# Patient Record
Sex: MALE | Race: WHITE | NOT HISPANIC OR LATINO | Employment: OTHER | URBAN - METROPOLITAN AREA
[De-identification: names, ages, dates, MRNs, and addresses within clinical notes are randomized per-mention and may not be internally consistent; named-entity substitution may affect disease eponyms.]

---

## 2017-08-22 ENCOUNTER — PATIENT OUTREACH (OUTPATIENT)
Dept: ADMINISTRATIVE | Facility: HOSPITAL | Age: 77
End: 2017-08-22

## 2017-08-22 NOTE — LETTER
August 22, 2017    Rigoberto Perez  [address]  [place] [address]             [facility]  [address]  [facility] [address]  Phone: [phone] Dear Mr. Perez:    [facility] is committed to your overall health.  To help you get the most out of each of your visits, we will review your information to make sure you are up to date on all of your recommended tests and/or procedures.      As a new patient to Dr. Keri Hobson, we may not have your complete medical records.  She has found that you may be due for your fasting cholesterol labs and possibly some immunizations (tetanus, shingles, and pneumonia).     Medicare does not cover all immunizations to be given in the clinic.  Check your benefits to ensure that you do not need to receive your immunizations at the pharmacy.    If you have had any of the above done at another facility, please bring the records or information with you so that your record at [facility] will be complete.  If you would like to schedule any of these, please contact me.    If you are currently taking medication, please bring it with you to your appointment for review.    Also, if you have any type of Advanced Directives, please bring them with you to your office visit so we may scan them into your chart.    If you have any questions or concerns, please don't hesitate to call.    Thank you for letting us care for you,  Savannah Fernandez LPN Clinical Care Coordinator  [facility]  [phone]

## 2017-10-12 ENCOUNTER — OFFICE VISIT (OUTPATIENT)
Dept: FAMILY MEDICINE | Facility: CLINIC | Age: 77
End: 2017-10-12
Payer: MEDICARE

## 2017-10-12 VITALS
TEMPERATURE: 98 F | OXYGEN SATURATION: 97 % | BODY MASS INDEX: 15.52 KG/M2 | HEIGHT: 71 IN | DIASTOLIC BLOOD PRESSURE: 74 MMHG | HEART RATE: 73 BPM | RESPIRATION RATE: 16 BRPM | SYSTOLIC BLOOD PRESSURE: 122 MMHG | WEIGHT: 110.88 LBS

## 2017-10-12 DIAGNOSIS — R42 DIZZINESS: Primary | ICD-10-CM

## 2017-10-12 DIAGNOSIS — R63.4 WEIGHT LOSS: ICD-10-CM

## 2017-10-12 LAB
ALBUMIN SERPL BCP-MCNC: 3.7 G/DL
ALP SERPL-CCNC: 80 U/L
ALT SERPL W/O P-5'-P-CCNC: 13 U/L
ANION GAP SERPL CALC-SCNC: 8 MMOL/L
AST SERPL-CCNC: 22 U/L
BASOPHILS # BLD AUTO: 0.05 K/UL
BASOPHILS NFR BLD: 0.7 %
BILIRUB SERPL-MCNC: 0.3 MG/DL
BUN SERPL-MCNC: 25 MG/DL
CALCIUM SERPL-MCNC: 9.8 MG/DL
CHLORIDE SERPL-SCNC: 103 MMOL/L
CO2 SERPL-SCNC: 27 MMOL/L
CREAT SERPL-MCNC: 1.4 MG/DL
DIFFERENTIAL METHOD: ABNORMAL
EOSINOPHIL # BLD AUTO: 0.8 K/UL
EOSINOPHIL NFR BLD: 11.1 %
ERYTHROCYTE [DISTWIDTH] IN BLOOD BY AUTOMATED COUNT: 13.9 %
EST. GFR  (AFRICAN AMERICAN): 55.2 ML/MIN/1.73 M^2
EST. GFR  (NON AFRICAN AMERICAN): 47.8 ML/MIN/1.73 M^2
GLUCOSE SERPL-MCNC: 89 MG/DL
HCT VFR BLD AUTO: 35.7 %
HGB BLD-MCNC: 11.6 G/DL
LYMPHOCYTES # BLD AUTO: 1 K/UL
LYMPHOCYTES NFR BLD: 14.8 %
MCH RBC QN AUTO: 30.9 PG
MCHC RBC AUTO-ENTMCNC: 32.5 G/DL
MCV RBC AUTO: 95 FL
MONOCYTES # BLD AUTO: 0.6 K/UL
MONOCYTES NFR BLD: 8.7 %
NEUTROPHILS # BLD AUTO: 4.4 K/UL
NEUTROPHILS NFR BLD: 64.4 %
PLATELET # BLD AUTO: 160 K/UL
PMV BLD AUTO: 10.8 FL
POTASSIUM SERPL-SCNC: 4.9 MMOL/L
PROT SERPL-MCNC: 7.8 G/DL
RBC # BLD AUTO: 3.76 M/UL
SODIUM SERPL-SCNC: 138 MMOL/L
TSH SERPL DL<=0.005 MIU/L-ACNC: 2.47 UIU/ML
WBC # BLD AUTO: 6.77 K/UL

## 2017-10-12 PROCEDURE — 36415 COLL VENOUS BLD VENIPUNCTURE: CPT | Mod: S$GLB,,, | Performed by: NURSE PRACTITIONER

## 2017-10-12 PROCEDURE — 85025 COMPLETE CBC W/AUTO DIFF WBC: CPT

## 2017-10-12 PROCEDURE — 99203 OFFICE O/P NEW LOW 30 MIN: CPT | Mod: S$GLB,,, | Performed by: NURSE PRACTITIONER

## 2017-10-12 PROCEDURE — 93010 ELECTROCARDIOGRAM REPORT: CPT | Mod: ,,, | Performed by: INTERNAL MEDICINE

## 2017-10-12 PROCEDURE — 84443 ASSAY THYROID STIM HORMONE: CPT

## 2017-10-12 PROCEDURE — 93005 ELECTROCARDIOGRAM TRACING: CPT | Mod: S$GLB,,, | Performed by: NURSE PRACTITIONER

## 2017-10-12 PROCEDURE — 80053 COMPREHEN METABOLIC PANEL: CPT

## 2017-10-12 RX ORDER — ASPIRIN 81 MG/1
81 TABLET ORAL DAILY
COMMUNITY
End: 2022-08-29 | Stop reason: SDUPTHER

## 2017-10-13 ENCOUNTER — TELEPHONE (OUTPATIENT)
Dept: FAMILY MEDICINE | Facility: CLINIC | Age: 77
End: 2017-10-13

## 2017-10-13 DIAGNOSIS — D64.9 ANEMIA, UNSPECIFIED TYPE: ICD-10-CM

## 2017-10-13 DIAGNOSIS — Z12.11 COLON CANCER SCREENING: Primary | ICD-10-CM

## 2017-10-13 NOTE — TELEPHONE ENCOUNTER
Please let the patient at that his blood work did show that he is anemic. I would like for him to do a colonoscopy if he has never done this.  I will let him know once I review the records from Dr Fung

## 2017-10-13 NOTE — PROGRESS NOTES
Subjective:       Patient ID: Rigoberto Perez is a 77 y.o. male.    Chief Complaint: Dizziness (x several mths / constant  / especially when he stands up/ pt saw ENT in [place])    The patient is here with his wife.  He has been having dizziness constantly on and off for the past couple months.  He went to his primary care provider Dr. Demond Fung. His PCP referred him to ENT. The ENT told him that this could be related to his carotid arteries are hard and did an echo and a carotid ultrasound.  The patient tells me that the results were normal.  He did not follow up with his primary care regarding this dizziness.  He is brand-new to me today.  Upon further questioning he did have a 40 pound weight loss over the past year or so.  He denies any night sweats or black or bloody stools.  He tells me he has not had any blood work or an EKG done in relation to this dizziness.  He feels like he is going to fall when walking.  The dizziness does not make him nauseated.  He does still work every day and tells me his history is essentially normal with the exception of skin cancer.  He does smoke for many years and tells me he did have a pulmonary function test done by his primary care provider but he never got the result of this.      Review of Systems   Constitutional: Positive for unexpected weight change. Negative for activity change and appetite change.   HENT: Negative for congestion, postnasal drip, rhinorrhea and sinus pressure.    Eyes: Negative for pain and redness.   Respiratory: Negative for choking and chest tightness.    Gastrointestinal: Negative for abdominal distention, abdominal pain, blood in stool, constipation, diarrhea, nausea and vomiting.   Endocrine: Negative for polydipsia and polyphagia.   Genitourinary: Negative for dysuria and hematuria.   Musculoskeletal: Negative for arthralgias and myalgias.   Skin: Negative for color change and rash.   Neurological: Positive for dizziness. Negative for  "headaches.   Psychiatric/Behavioral: Negative for agitation and behavioral problems.       Objective:       Vitals:    10/12/17 1211   BP: 122/74   Pulse: 73   Resp: 16   Temp: 98.1 °F (36.7 °C)   TempSrc: Oral   SpO2: 97%   Weight: 50.3 kg (110 lb 14.3 oz)   Height: 5' 11" (1.803 m)   PainSc: 0-No pain       Physical Exam   Constitutional: He is oriented to person, place, and time. He appears well-developed and well-nourished. No distress.   Very thin, elderly male    HENT:   Head: Normocephalic and atraumatic.   Right Ear: Hearing, tympanic membrane, external ear and ear canal normal.   Left Ear: Hearing, tympanic membrane, external ear and ear canal normal.   Nose: Nose normal.   Mouth/Throat: Uvula is midline, oropharynx is clear and moist and mucous membranes are normal.   Eyes: Conjunctivae and EOM are normal. Pupils are equal, round, and reactive to light. Right eye exhibits no discharge. Left eye exhibits no discharge.   Neck: Trachea normal and normal range of motion. Neck supple. No JVD present. Carotid bruit is not present. No thyromegaly present.   Cardiovascular: Normal rate and regular rhythm.  Exam reveals no gallop and no friction rub.    No murmur heard.  Pulmonary/Chest: Effort normal and breath sounds normal. No respiratory distress. He has no wheezes. He has no rales. He exhibits no tenderness.   Abdominal: Soft. Bowel sounds are normal. He exhibits no distension and no mass. There is no tenderness. There is no rebound and no guarding.   Musculoskeletal: Normal range of motion.   Neurological: He is alert and oriented to person, place, and time. Coordination normal.   Skin: Skin is warm and dry. He is not diaphoretic.   Psychiatric: He has a normal mood and affect. His behavior is normal. Judgment and thought content normal.       Assessment:       1. Dizziness    2. Weight loss        Plan:       Rigoberto was seen today for dizziness.    Diagnoses and all orders for this visit:    Dizziness  -    "  CBC auto differential  -     Comprehensive metabolic panel  -     TSH  -     IN OFFICE EKG 12-LEAD (to Muse)    Weight loss  -     TSH     I will obtain records from his PCP and see what workup has been done thus far.         EKG shows sinus rhythm with a rate of 66 and a normal axis.  The EKG was personally reviewed by me here in the office.

## 2017-10-17 ENCOUNTER — TELEPHONE (OUTPATIENT)
Dept: FAMILY MEDICINE | Facility: CLINIC | Age: 77
End: 2017-10-17

## 2017-10-17 NOTE — TELEPHONE ENCOUNTER
----- Message from Lizette Madison sent at 10/16/2017  2:59 PM CDT -----  Contact: Patient's wife, Digna  Patient's wife is calling for patients test results.  Call Back# [phone]  Thanks

## 2017-11-07 ENCOUNTER — TELEPHONE (OUTPATIENT)
Dept: FAMILY MEDICINE | Facility: CLINIC | Age: 77
End: 2017-11-07

## 2017-11-07 NOTE — TELEPHONE ENCOUNTER
Received JAYLA from Dr Deepak lopez [facility] [place]

## 2017-11-16 ENCOUNTER — TELEPHONE (OUTPATIENT)
Dept: FAMILY MEDICINE | Facility: CLINIC | Age: 77
End: 2017-11-16

## 2017-11-16 NOTE — TELEPHONE ENCOUNTER
----- Message from Lizette Madison sent at 11/15/2017 10:53 AM CST -----  Contact: Patient's wife, Digna  Patient's wife is calling to see if the doctor received patients records from Dr. Lagos in [place].  Call Back#[phone]  Thanks

## 2017-12-22 ENCOUNTER — TELEPHONE (OUTPATIENT)
Dept: FAMILY MEDICINE | Facility: CLINIC | Age: 77
End: 2017-12-22

## 2017-12-22 RX ORDER — ALBUTEROL SULFATE 0.83 MG/ML
SOLUTION RESPIRATORY (INHALATION)
Refills: 0 | COMMUNITY
Start: 2017-12-20 | End: 2019-02-20 | Stop reason: SDUPTHER

## 2017-12-22 RX ORDER — DOXYCYCLINE 100 MG/1
100 CAPSULE ORAL 2 TIMES DAILY
Refills: 0 | COMMUNITY
Start: 2017-12-20 | End: 2018-01-11 | Stop reason: ALTCHOICE

## 2017-12-22 RX ORDER — PREDNISONE 20 MG/1
TABLET ORAL
Refills: 0 | COMMUNITY
Start: 2017-12-20 | End: 2018-01-11 | Stop reason: ALTCHOICE

## 2017-12-22 NOTE — TELEPHONE ENCOUNTER
----- Message from Krista Pascual sent at 12/22/2017 11:30 AM CST -----  Contact: Digna  Patient's wife is calling to state patient went to [facility] 12/20/17; Dx chronic bronchitis, low magnesium, dehydrated, and shortness of breath. Asked to speak to office before making appointment. Please call [phone]. Thanks!

## 2017-12-22 NOTE — TELEPHONE ENCOUNTER
Pt is feeling better, doing breathing treatments, taking abx etc, all added to chart for review. Please advise if pt will ilana follow up next week.

## 2017-12-22 NOTE — TELEPHONE ENCOUNTER
If he is 100% better he does not have to follow up with me but if he continues to have any symptoms I would like to see him in the office.

## 2018-01-11 ENCOUNTER — OFFICE VISIT (OUTPATIENT)
Dept: FAMILY MEDICINE | Facility: CLINIC | Age: 78
End: 2018-01-11
Payer: MEDICARE

## 2018-01-11 VITALS
DIASTOLIC BLOOD PRESSURE: 60 MMHG | HEIGHT: 71 IN | BODY MASS INDEX: 15.18 KG/M2 | TEMPERATURE: 98 F | SYSTOLIC BLOOD PRESSURE: 96 MMHG | WEIGHT: 108.44 LBS | RESPIRATION RATE: 20 BRPM | HEART RATE: 72 BPM

## 2018-01-11 DIAGNOSIS — J44.9 CHRONIC OBSTRUCTIVE PULMONARY DISEASE, UNSPECIFIED COPD TYPE: ICD-10-CM

## 2018-01-11 DIAGNOSIS — F17.200 TOBACCO USE DISORDER: ICD-10-CM

## 2018-01-11 DIAGNOSIS — Z12.11 COLON CANCER SCREENING: ICD-10-CM

## 2018-01-11 DIAGNOSIS — R42 DIZZINESS: Primary | ICD-10-CM

## 2018-01-11 PROCEDURE — 99214 OFFICE O/P EST MOD 30 MIN: CPT | Mod: 25,S$GLB,, | Performed by: NURSE PRACTITIONER

## 2018-01-11 PROCEDURE — 90662 IIV NO PRSV INCREASED AG IM: CPT | Mod: S$GLB,,, | Performed by: FAMILY MEDICINE

## 2018-01-11 PROCEDURE — G0008 ADMIN INFLUENZA VIRUS VAC: HCPCS | Mod: S$GLB,,, | Performed by: FAMILY MEDICINE

## 2018-01-11 RX ORDER — PREDNISONE 20 MG/1
TABLET ORAL
Qty: 18 TABLET | Refills: 0 | Status: SHIPPED | OUTPATIENT
Start: 2018-01-11 | End: 2018-01-11

## 2018-01-11 RX ORDER — NEBULIZER AND COMPRESSOR
EACH MISCELLANEOUS
COMMUNITY
Start: 2017-12-20

## 2018-01-11 RX ORDER — FLUTICASONE FUROATE AND VILANTEROL 200; 25 UG/1; UG/1
1 POWDER RESPIRATORY (INHALATION) DAILY
Qty: 30 EACH | Refills: 11 | Status: SHIPPED | OUTPATIENT
Start: 2018-01-11 | End: 2019-01-14 | Stop reason: SDUPTHER

## 2018-01-11 NOTE — PROGRESS NOTES
"Subjective:       Patient ID: Rigoberto Perez is a 77 y.o. male.    Chief Complaint: Follow-up    The patient is here for a follow-up visit.  He recently went to the ER with cough and congestion and was diagnosed with COPD exacerbation.  The patient does continue to smoke.  He was given antibiotics and steroids and tells me he feels much better now.  More importantly he tells me he is still dizzy and has been dizzy for quite some time.  He is not dizzy when changing positions just sometimes feels like he is off balance when standing up like he could fall.  His wife tells me he has severe instability when walking and often times falls.      The patient had an echocardiogram on 8/30/17 which shows an EF of 45-50%, a normal right ventricle.  Normal right ventricular systolic function, mild mitral regurg, mild tricuspid valve prolapse, no aortic regurgitation or stenosis and no pulmonic valvular regurgitation or stenosis.  Carotid artery done on 8/30/17 shows bilateral atheromatous plaquing that it is less than 50% diameter reduction      Review of Systems   Constitutional: Negative for appetite change, chills and fever.   HENT: Negative for ear pain and postnasal drip.    Eyes: Negative for pain and itching.   Respiratory: Negative for chest tightness and shortness of breath.    Gastrointestinal: Negative for abdominal distention and abdominal pain.   Endocrine: Negative for polydipsia and polyuria.   Genitourinary: Negative for difficulty urinating and flank pain.   Skin: Negative for color change and pallor.   Neurological: Positive for dizziness and weakness. Negative for light-headedness and headaches.   Hematological: Negative for adenopathy. Does not bruise/bleed easily.   Psychiatric/Behavioral: Negative for agitation.       Objective:       Vitals:    01/11/18 1530   BP: 96/60   Pulse: 72   Resp: 20   Temp: 97.8 °F (36.6 °C)   TempSrc: Oral   Weight: 49.2 kg (108 lb 7.5 oz)   Height: 5' 11" (1.803 m) "   PainSc: 0-No pain       Physical Exam   Constitutional: He is oriented to person, place, and time. He appears well-developed.   thin, elderly male.   HENT:   Head: Normocephalic and atraumatic.   Right Ear: External ear normal.   Left Ear: External ear normal.   Nose: Nose normal.   Mouth/Throat: Oropharynx is clear and moist.   Eyes: Conjunctivae are normal. Right eye exhibits no discharge. Left eye exhibits no discharge. No scleral icterus.   Neck: Normal range of motion. Neck supple. No tracheal deviation present.   Cardiovascular: Normal rate, regular rhythm and normal heart sounds.  Exam reveals no friction rub.    No murmur heard.  Pulmonary/Chest: Effort normal. No stridor. No respiratory distress. He has wheezes. He has no rales. He exhibits no tenderness.   Pulse ox 96%.  Mild inspiratory wheezing to the upper lobes.   Musculoskeletal: Normal range of motion.   Lymphadenopathy:     He has no cervical adenopathy.   Neurological: He is alert and oriented to person, place, and time.   Skin: Skin is warm and dry.   Psychiatric: He has a normal mood and affect.       Assessment:       1. Dizziness    2. Colon cancer screening    3. Chronic obstructive pulmonary disease, unspecified COPD type    4. Tobacco use disorder        Plan:       Rigoberto was seen today for follow-up.    Diagnoses and all orders for this visit:    Dizziness/gait instability  -     MRI Brain Without Contrast; Future    Colon cancer screening  -     Case request GI: COLONOSCOPY    Chronic obstructive pulmonary disease, unspecified COPD type  -     fluticasone-vilanterol (BREO) 200-25 mcg/dose DsDv diskus inhaler; Inhale 1 puff into the lungs once daily. Controller    Tobacco use disorder  Smoking cessation consult provided    -     Influenza - High Dose (65+) (PF) (IM)

## 2018-01-20 ENCOUNTER — HOSPITAL ENCOUNTER (OUTPATIENT)
Dept: RADIOLOGY | Facility: HOSPITAL | Age: 78
Discharge: HOME | End: 2018-01-20
Attending: NURSE PRACTITIONER
Payer: MEDICARE

## 2018-01-20 DIAGNOSIS — R42 DIZZINESS: ICD-10-CM

## 2018-01-20 PROCEDURE — 70551 MRI BRAIN STEM W/O DYE: CPT | Mod: TC,PO

## 2018-01-20 PROCEDURE — 70551 MRI BRAIN STEM W/O DYE: CPT | Mod: 26,,, | Performed by: RADIOLOGY

## 2018-01-22 ENCOUNTER — TELEPHONE (OUTPATIENT)
Dept: FAMILY MEDICINE | Facility: CLINIC | Age: 78
End: 2018-01-22

## 2018-01-22 NOTE — TELEPHONE ENCOUNTER
His MRI does not show any specific reason for his dizziness.  I will review this in detail at his upcoming 2/8/18 appointment

## 2018-01-25 ENCOUNTER — PATIENT OUTREACH (OUTPATIENT)
Dept: ADMINISTRATIVE | Facility: HOSPITAL | Age: 78
End: 2018-01-25

## 2018-01-25 NOTE — LETTER
January 25, 2018    Rigoberto Perez  [address] [address]  [place] [address]             [facility]  [address]  [facility] [address]  Phone: [phone] Dear Mr. Perez:    [facility] is committed to your overall health.  To help you get the most out of each of your visits, we will review your information to make sure you are up to date on all of your recommended tests and/or procedures.      TIESHA Davidson has found that your chart shows you may be due for your fasting cholesterol labs and possibly some immunizations (tetanus, shingles, and pneumonia).     Medicare does not cover all immunizations to be given in the clinic.  Check your benefits to ensure that you do not need to receive your immunizations at the pharmacy.    If you have had any of the above done at another facility, please bring the records or information with you so that your record at [facility] will be complete.  If you would like to schedule any of these, please contact me.    If you are currently taking medication, please bring it with you to your appointment for review.    Also, if you have any type of Advanced Directives, please bring them with you to your office visit so we may scan them into your chart.    If you have any questions or concerns, please don't hesitate to call.    Thank you for letting us care for you,  Savannah Fernandez LPN Clinical Care Coordinator  [facility]  [phone]

## 2018-02-08 ENCOUNTER — OFFICE VISIT (OUTPATIENT)
Dept: FAMILY MEDICINE | Facility: CLINIC | Age: 78
End: 2018-02-08
Payer: MEDICARE

## 2018-02-08 VITALS
HEIGHT: 71 IN | WEIGHT: 115.19 LBS | HEART RATE: 76 BPM | DIASTOLIC BLOOD PRESSURE: 70 MMHG | SYSTOLIC BLOOD PRESSURE: 136 MMHG | BODY MASS INDEX: 16.13 KG/M2 | TEMPERATURE: 98 F | RESPIRATION RATE: 20 BRPM

## 2018-02-08 DIAGNOSIS — R42 DIZZINESS: Primary | ICD-10-CM

## 2018-02-08 DIAGNOSIS — Z79.899 ENCOUNTER FOR LONG-TERM CURRENT USE OF MEDICATION: ICD-10-CM

## 2018-02-08 DIAGNOSIS — Z23 IMMUNIZATION DUE: ICD-10-CM

## 2018-02-08 PROCEDURE — 3008F BODY MASS INDEX DOCD: CPT | Mod: S$GLB,,, | Performed by: NURSE PRACTITIONER

## 2018-02-08 PROCEDURE — 1126F AMNT PAIN NOTED NONE PRSNT: CPT | Mod: S$GLB,,, | Performed by: NURSE PRACTITIONER

## 2018-02-08 PROCEDURE — 1159F MED LIST DOCD IN RCRD: CPT | Mod: S$GLB,,, | Performed by: NURSE PRACTITIONER

## 2018-02-08 PROCEDURE — 90670 PCV13 VACCINE IM: CPT | Mod: S$GLB,,, | Performed by: FAMILY MEDICINE

## 2018-02-08 PROCEDURE — 99213 OFFICE O/P EST LOW 20 MIN: CPT | Mod: S$GLB,,, | Performed by: NURSE PRACTITIONER

## 2018-02-08 PROCEDURE — G0009 ADMIN PNEUMOCOCCAL VACCINE: HCPCS | Mod: S$GLB,,, | Performed by: FAMILY MEDICINE

## 2018-02-08 NOTE — PROGRESS NOTES
"Subjective:       Patient ID: Rigoberto Perez is a 77 y.o. male.    Chief Complaint: Follow-up    The patient is here for a follow-up dizziness visit.  Since our last appointment we did have an MRI of the brain done which reveals no acute abnormality.  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  The patient states since our last visit his dizziness has improved significantly and he would not like to have any further testing done.  He denies any chest pain or palpitations.  He has also quit smoking since our last visit. He has had many stress test in past (negative per patient).       Review of Systems   Constitutional: Negative for activity change and appetite change.   HENT: Negative for congestion, postnasal drip, rhinorrhea and sinus pressure.    Eyes: Negative for pain and redness.   Respiratory: Negative for choking and chest tightness.    Gastrointestinal: Negative for abdominal distention, abdominal pain, blood in stool, constipation, diarrhea, nausea and vomiting.   Endocrine: Negative for polydipsia and polyphagia.   Genitourinary: Negative for dysuria and hematuria.   Musculoskeletal: Negative for arthralgias and myalgias.   Skin: Negative for color change and rash.   Neurological: Negative for dizziness and headaches.   Psychiatric/Behavioral: Negative for agitation and behavioral problems.       Objective:       Vitals:    02/08/18 0853   BP: 136/70   Pulse: 76   Resp: 20   Temp: 97.9 °F (36.6 °C)   TempSrc: Oral   Weight: 52.3 kg (115 lb 3.1 oz)   Height: 5' 11" (1.803 m)   PainSc: 0-No pain       Physical Exam   Constitutional: He is oriented to person, place, and time. He appears well-developed and well-nourished.   HENT:   Head: Normocephalic and atraumatic.   Right Ear: External ear normal.   Left Ear: External ear normal.   Nose: Nose normal.   Mouth/Throat: Oropharynx is clear and moist.   Eyes: Conjunctivae are normal. Right eye exhibits no " discharge. Left eye exhibits no discharge. No scleral icterus.   Neck: Normal range of motion. Neck supple. No tracheal deviation present.   Cardiovascular: Normal rate, regular rhythm and normal heart sounds.  Exam reveals no friction rub.    No murmur heard.  Pulmonary/Chest: Effort normal and breath sounds normal. No stridor. No respiratory distress. He has no wheezes. He has no rales. He exhibits no tenderness.   Musculoskeletal: Normal range of motion.   Lymphadenopathy:     He has no cervical adenopathy.   Neurological: He is alert and oriented to person, place, and time.   Skin: Skin is warm and dry.   Psychiatric: He has a normal mood and affect.       Assessment:       1. Dizziness    2. Encounter for long-term current use of medication    3. Immunization due        Plan:       Rigoberto was seen today for follow-up.    Diagnoses and all orders for this visit:    Dizziness-improved.  The patient declined further testing as I recommended a stress test as well as a low-dose CT to rule out lung cancer for screening    Encounter for long-term current use of medication  -     CBC auto differential; Future  -     Comprehensive metabolic panel; Future  -     Lipid panel; Future  -     Hemoglobin A1c; Future  -     TSH; Future    Immunization due  -     (In Office Administered) Pneumococcal Conjugate Vaccine (13 Valent) (IM)

## 2018-02-19 ENCOUNTER — TELEPHONE (OUTPATIENT)
Dept: GASTROENTEROLOGY | Facility: CLINIC | Age: 78
End: 2018-02-19

## 2019-01-15 RX ORDER — FLUTICASONE FUROATE AND VILANTEROL TRIFENATATE 200; 25 UG/1; UG/1
POWDER RESPIRATORY (INHALATION)
Qty: 1 EACH | Refills: 0 | Status: SHIPPED | OUTPATIENT
Start: 2019-01-15 | End: 2019-01-23 | Stop reason: SDUPTHER

## 2019-01-23 ENCOUNTER — OFFICE VISIT (OUTPATIENT)
Dept: FAMILY MEDICINE | Facility: CLINIC | Age: 79
End: 2019-01-23
Payer: MEDICARE

## 2019-01-23 VITALS
RESPIRATION RATE: 18 BRPM | DIASTOLIC BLOOD PRESSURE: 70 MMHG | OXYGEN SATURATION: 98 % | BODY MASS INDEX: 15.65 KG/M2 | WEIGHT: 111.75 LBS | HEIGHT: 71 IN | TEMPERATURE: 98 F | HEART RATE: 76 BPM | SYSTOLIC BLOOD PRESSURE: 126 MMHG

## 2019-01-23 DIAGNOSIS — Z79.899 ENCOUNTER FOR LONG-TERM CURRENT USE OF MEDICATION: ICD-10-CM

## 2019-01-23 DIAGNOSIS — J44.1 COPD EXACERBATION: ICD-10-CM

## 2019-01-23 DIAGNOSIS — F17.200 TOBACCO USE DISORDER: ICD-10-CM

## 2019-01-23 DIAGNOSIS — J32.9 SINUSITIS, UNSPECIFIED CHRONICITY, UNSPECIFIED LOCATION: ICD-10-CM

## 2019-01-23 DIAGNOSIS — J44.9 CHRONIC OBSTRUCTIVE PULMONARY DISEASE, UNSPECIFIED COPD TYPE: Primary | ICD-10-CM

## 2019-01-23 LAB
ALBUMIN SERPL BCP-MCNC: 3.9 G/DL
ALP SERPL-CCNC: 131 U/L
ALT SERPL W/O P-5'-P-CCNC: 13 U/L
ANION GAP SERPL CALC-SCNC: 8 MMOL/L
AST SERPL-CCNC: 24 U/L
BASOPHILS # BLD AUTO: 0.06 K/UL
BASOPHILS NFR BLD: 0.7 %
BILIRUB SERPL-MCNC: 0.4 MG/DL
BUN SERPL-MCNC: 20 MG/DL
CALCIUM SERPL-MCNC: 10.2 MG/DL
CHLORIDE SERPL-SCNC: 100 MMOL/L
CHOLEST SERPL-MCNC: 167 MG/DL
CHOLEST/HDLC SERPL: 3.2 {RATIO}
CO2 SERPL-SCNC: 28 MMOL/L
CREAT SERPL-MCNC: 1.3 MG/DL
DIFFERENTIAL METHOD: ABNORMAL
EOSINOPHIL # BLD AUTO: 0.2 K/UL
EOSINOPHIL NFR BLD: 2.1 %
ERYTHROCYTE [DISTWIDTH] IN BLOOD BY AUTOMATED COUNT: 13.4 %
EST. GFR  (AFRICAN AMERICAN): 60 ML/MIN/1.73 M^2
EST. GFR  (NON AFRICAN AMERICAN): 51.9 ML/MIN/1.73 M^2
ESTIMATED AVG GLUCOSE: 105 MG/DL
GLUCOSE SERPL-MCNC: 77 MG/DL
HBA1C MFR BLD HPLC: 5.3 %
HCT VFR BLD AUTO: 37.5 %
HDLC SERPL-MCNC: 52 MG/DL
HDLC SERPL: 31.1 %
HGB BLD-MCNC: 12.3 G/DL
IMM GRANULOCYTES # BLD AUTO: 0.01 K/UL
IMM GRANULOCYTES NFR BLD AUTO: 0.1 %
LDLC SERPL CALC-MCNC: 100.2 MG/DL
LYMPHOCYTES # BLD AUTO: 1.4 K/UL
LYMPHOCYTES NFR BLD: 15.5 %
MCH RBC QN AUTO: 31.4 PG
MCHC RBC AUTO-ENTMCNC: 32.8 G/DL
MCV RBC AUTO: 96 FL
MONOCYTES # BLD AUTO: 0.5 K/UL
MONOCYTES NFR BLD: 6.2 %
NEUTROPHILS # BLD AUTO: 6.6 K/UL
NEUTROPHILS NFR BLD: 75.4 %
NONHDLC SERPL-MCNC: 115 MG/DL
NRBC BLD-RTO: 0 /100 WBC
PLATELET # BLD AUTO: 230 K/UL
PMV BLD AUTO: 10 FL
POTASSIUM SERPL-SCNC: 4.9 MMOL/L
PROT SERPL-MCNC: 8 G/DL
RBC # BLD AUTO: 3.92 M/UL
SODIUM SERPL-SCNC: 136 MMOL/L
TRIGL SERPL-MCNC: 74 MG/DL
TSH SERPL DL<=0.005 MIU/L-ACNC: 2.13 UIU/ML
WBC # BLD AUTO: 8.73 K/UL

## 2019-01-23 PROCEDURE — 80053 COMPREHEN METABOLIC PANEL: CPT

## 2019-01-23 PROCEDURE — 80061 LIPID PANEL: CPT

## 2019-01-23 PROCEDURE — 99214 OFFICE O/P EST MOD 30 MIN: CPT | Mod: S$GLB,,, | Performed by: NURSE PRACTITIONER

## 2019-01-23 PROCEDURE — 84443 ASSAY THYROID STIM HORMONE: CPT

## 2019-01-23 PROCEDURE — 36415 PR COLLECTION VENOUS BLOOD,VENIPUNCTURE: ICD-10-PCS | Mod: S$GLB,,, | Performed by: NURSE PRACTITIONER

## 2019-01-23 PROCEDURE — 85025 COMPLETE CBC W/AUTO DIFF WBC: CPT

## 2019-01-23 PROCEDURE — 83036 HEMOGLOBIN GLYCOSYLATED A1C: CPT

## 2019-01-23 PROCEDURE — 1101F PR PT FALLS ASSESS DOC 0-1 FALLS W/OUT INJ PAST YR: ICD-10-PCS | Mod: CPTII,S$GLB,, | Performed by: NURSE PRACTITIONER

## 2019-01-23 PROCEDURE — 36415 COLL VENOUS BLD VENIPUNCTURE: CPT | Mod: S$GLB,,, | Performed by: NURSE PRACTITIONER

## 2019-01-23 PROCEDURE — 99214 PR OFFICE/OUTPT VISIT, EST, LEVL IV, 30-39 MIN: ICD-10-PCS | Mod: S$GLB,,, | Performed by: NURSE PRACTITIONER

## 2019-01-23 PROCEDURE — 1101F PT FALLS ASSESS-DOCD LE1/YR: CPT | Mod: CPTII,S$GLB,, | Performed by: NURSE PRACTITIONER

## 2019-01-23 RX ORDER — FLUTICASONE FUROATE AND VILANTEROL 200; 25 UG/1; UG/1
POWDER RESPIRATORY (INHALATION)
Qty: 1 EACH | Refills: 11 | Status: SHIPPED | OUTPATIENT
Start: 2019-01-23 | End: 2022-08-29

## 2019-01-23 RX ORDER — DOXYCYCLINE 100 MG/1
100 CAPSULE ORAL 2 TIMES DAILY
Qty: 14 CAPSULE | Refills: 0 | Status: SHIPPED | OUTPATIENT
Start: 2019-01-23 | End: 2019-01-30

## 2019-01-23 RX ORDER — PREDNISONE 20 MG/1
TABLET ORAL
Qty: 18 TABLET | Refills: 0 | Status: SHIPPED | OUTPATIENT
Start: 2019-01-23 | End: 2019-02-20 | Stop reason: ALTCHOICE

## 2019-01-23 NOTE — PROGRESS NOTES
Subjective:       Patient ID: Rigoberto Perez is a 78 y.o. male.    Chief Complaint: Preventative Health Care (Physical/ refills for Breo)    The patient is here for annual visit. He is having URI symptoms over the past few weeks. See URI ROS.   The patient has a history of COPD that has been well controlled with Breo until this recent infection.  He does need a refill on this medication.  Otherwise he has no other complaints.  His wife is with him today.  He did resume smoking again.      URI    This is a new problem. Episode onset: 2-3 weeks. The problem has been gradually worsening. Associated symptoms include congestion, coughing, headaches, a plugged ear sensation, rhinorrhea, sinus pain, sneezing, a sore throat, swollen glands and wheezing. Pertinent negatives include no abdominal pain, chest pain, diarrhea, dysuria, joint pain, joint swelling, nausea, neck pain, rash or vomiting. He has tried decongestant, antihistamine, increased fluids and acetaminophen for the symptoms. The treatment provided no relief.     Review of Systems   Constitutional: Positive for appetite change and fatigue.   HENT: Positive for congestion, postnasal drip, rhinorrhea, sinus pressure, sinus pain, sneezing and sore throat.    Eyes: Negative for pain and redness.   Respiratory: Positive for cough and wheezing. Negative for choking, chest tightness and shortness of breath.    Cardiovascular: Negative for chest pain and palpitations.   Gastrointestinal: Negative for abdominal distention, abdominal pain, constipation, diarrhea, nausea and vomiting.   Endocrine: Negative for polydipsia and polyphagia.   Genitourinary: Negative for dysuria and hematuria.   Musculoskeletal: Negative for arthralgias, joint pain, joint swelling, myalgias and neck pain.   Skin: Negative for color change, pallor and rash.   Neurological: Positive for headaches. Negative for dizziness and light-headedness.       Past medical, surgical, family and social  "history reviewed.  Objective:     Vitals:    01/23/19 1043   BP: 126/70   Pulse: 76   Resp: 18   Temp: 98.1 °F (36.7 °C)   TempSrc: Oral   SpO2: 98%   Weight: 50.7 kg (111 lb 12.4 oz)   Height: 5' 11" (1.803 m)   PainSc: 0-No pain     Body mass index is 15.59 kg/m².     Physical Exam   Constitutional: He is oriented to person, place, and time. He appears well-developed. No distress.   Under weight elderly male   HENT:   Head: Normocephalic and atraumatic.   Right Ear: Hearing, tympanic membrane, external ear and ear canal normal.   Left Ear: Hearing, tympanic membrane, external ear and ear canal normal.   Nose: Nose normal. No nose lacerations or sinus tenderness.   Mouth/Throat: Uvula is midline, oropharynx is clear and moist and mucous membranes are normal.   Eyes: Conjunctivae and EOM are normal. Pupils are equal, round, and reactive to light. Right eye exhibits no discharge. Left eye exhibits no discharge. No scleral icterus.   Neck: Trachea normal and normal range of motion. Neck supple. No JVD present. Carotid bruit is not present. No tracheal deviation present. No thyromegaly present.   Cardiovascular: Normal rate and regular rhythm. Exam reveals no gallop and no friction rub.   No murmur heard.  Pulmonary/Chest: Effort normal. No stridor. No respiratory distress. He has wheezes. He has no rales. He exhibits no tenderness.   Inspiratory and expiratory wheezing to the upper lobes.   Abdominal: Soft. Bowel sounds are normal. He exhibits no distension and no mass. There is no tenderness. There is no rebound and no guarding.   Musculoskeletal: Normal range of motion.   Lymphadenopathy:     He has cervical adenopathy.   Neurological: He is alert and oriented to person, place, and time. Coordination normal.   Skin: Skin is warm and dry. He is not diaphoretic.   Psychiatric: He has a normal mood and affect. His behavior is normal. Judgment and thought content normal.     Venipuncture performed with 20 gauge " butterfly, x's 1 attempt,  to R Antecubital vein.  Specimens collected per orders.      Pressure dressing applied to site, instructed patient to remove dressing in 10-15 minutes, OK to re-adjust dressing if pressure causing any discomfort, to observe closely for numbness and/or discoloration to hand or fingers, and to notify provider if bleeding persists after applying constant pressure lasting 30 minutes.            Assessment:       1. Chronic obstructive pulmonary disease, unspecified COPD type    2. Encounter for long-term current use of medication    3. Sinusitis, unspecified chronicity, unspecified location    4. COPD exacerbation        Plan:       Rigoberto was seen today for preventative health care.    Diagnoses and all orders for this visit:    Chronic obstructive pulmonary disease, unspecified COPD type  -     fluticasone-vilanterol (BREO ELLIPTA) 200-25 mcg/dose DsDv diskus inhaler; INHALE ONE PUFF into the lungs ONCE DAILY **controller**    Encounter for long-term current use of medication  -     CBC auto differential  -     Comprehensive metabolic panel  -     Hemoglobin A1c  -     Lipid panel  -     TSH    Sinusitis, unspecified chronicity, unspecified location    -     doxycycline (VIBRAMYCIN) 100 MG Cap; Take 1 capsule (100 mg total) by mouth 2 (two) times daily. for 7 days      COPD exacerbation  -     predniSONE (DELTASONE) 20 MG tablet; Take 3 tablets daily for 3 days, then 2 tablets daily for 3 days, then 1 tablet daily for 3 days        smoking cessation counseling provided

## 2019-02-02 ENCOUNTER — TELEPHONE (OUTPATIENT)
Dept: FAMILY MEDICINE | Facility: CLINIC | Age: 79
End: 2019-02-02

## 2019-02-02 NOTE — TELEPHONE ENCOUNTER
"Called patient and informed him per provider "Labs stable   Follow up with me in 1 year or sooner if needed ". Patient verbalized understanding.   "

## 2019-02-02 NOTE — TELEPHONE ENCOUNTER
----- Message from Joan Cadena NP sent at 2/1/2019 11:18 AM CST -----  Labs stable  Follow up with me in 1 year or sooner if needed

## 2019-02-14 ENCOUNTER — TELEPHONE (OUTPATIENT)
Dept: FAMILY MEDICINE | Facility: CLINIC | Age: 79
End: 2019-02-14

## 2019-02-14 NOTE — TELEPHONE ENCOUNTER
Spoke w/ pt wife , pt has chest congestion x 1 week . Pt has slight cough. Pt denies fever, denies wheezing. Pt feels tired and fatigued. No appts available in [facility] , offered to sched pt an appt in [place] , pt wife refused. Advised wife she can bring pt to urgent care. Advised on importance of staying hydrated. --lp

## 2019-02-14 NOTE — TELEPHONE ENCOUNTER
----- Message from Thais Hinojosa sent at 2/14/2019  9:26 AM CST -----  Type:  Same Day Appointment Request    Caller is requesting a same day appointment.  Caller declined first available appointment listed below.      Name of Caller:  Tavia curiel  When is the first available appointment?  02/19/19  Symptoms:  Congestion not feeling well  Best Call Back Number:  [phone]  Additional Information:

## 2019-02-20 ENCOUNTER — OFFICE VISIT (OUTPATIENT)
Dept: FAMILY MEDICINE | Facility: CLINIC | Age: 79
End: 2019-02-20
Payer: MEDICARE

## 2019-02-20 VITALS
SYSTOLIC BLOOD PRESSURE: 100 MMHG | HEIGHT: 71 IN | BODY MASS INDEX: 14.61 KG/M2 | DIASTOLIC BLOOD PRESSURE: 60 MMHG | HEART RATE: 76 BPM | TEMPERATURE: 98 F | RESPIRATION RATE: 24 BRPM | WEIGHT: 104.38 LBS | OXYGEN SATURATION: 94 %

## 2019-02-20 DIAGNOSIS — F17.200 TOBACCO USE DISORDER: ICD-10-CM

## 2019-02-20 DIAGNOSIS — J32.9 SINUSITIS, UNSPECIFIED CHRONICITY, UNSPECIFIED LOCATION: Primary | ICD-10-CM

## 2019-02-20 DIAGNOSIS — J44.1 COPD EXACERBATION: ICD-10-CM

## 2019-02-20 PROCEDURE — 99214 PR OFFICE/OUTPT VISIT, EST, LEVL IV, 30-39 MIN: ICD-10-PCS | Mod: S$GLB,,, | Performed by: NURSE PRACTITIONER

## 2019-02-20 PROCEDURE — 99214 OFFICE O/P EST MOD 30 MIN: CPT | Mod: S$GLB,,, | Performed by: NURSE PRACTITIONER

## 2019-02-20 PROCEDURE — 1101F PR PT FALLS ASSESS DOC 0-1 FALLS W/OUT INJ PAST YR: ICD-10-PCS | Mod: CPTII,S$GLB,, | Performed by: NURSE PRACTITIONER

## 2019-02-20 PROCEDURE — 1101F PT FALLS ASSESS-DOCD LE1/YR: CPT | Mod: CPTII,S$GLB,, | Performed by: NURSE PRACTITIONER

## 2019-02-20 RX ORDER — DOXYCYCLINE 100 MG/1
100 CAPSULE ORAL 2 TIMES DAILY
Qty: 14 CAPSULE | Refills: 0 | Status: SHIPPED | OUTPATIENT
Start: 2019-02-20 | End: 2019-02-27

## 2019-02-20 RX ORDER — BENZONATATE 100 MG/1
100 CAPSULE ORAL 3 TIMES DAILY
Refills: 0 | COMMUNITY
Start: 2019-02-15 | End: 2022-08-29

## 2019-02-20 RX ORDER — ALBUTEROL SULFATE 0.83 MG/ML
SOLUTION RESPIRATORY (INHALATION)
Qty: 1 BOX | Refills: 1 | Status: SHIPPED | OUTPATIENT
Start: 2019-02-20 | End: 2022-06-26 | Stop reason: SDUPTHER

## 2019-02-20 RX ORDER — PREDNISONE 20 MG/1
TABLET ORAL
Qty: 18 TABLET | Refills: 0 | Status: SHIPPED | OUTPATIENT
Start: 2019-02-20 | End: 2022-06-26 | Stop reason: SDUPTHER

## 2019-02-24 NOTE — PROGRESS NOTES
"Subjective:       Patient ID: Rigoberto Perez is a 78 y.o. male.    Chief Complaint: Cough (Symptoms for about 2 weeks) and Chest Congestion    Patient does have COPD currently using Breo daily      URI    This is a new problem. The current episode started 1 to 4 weeks ago. The problem has been unchanged. Associated symptoms include congestion, coughing, ear pain, headaches, a plugged ear sensation, rhinorrhea, sinus pain, sneezing, a sore throat, swollen glands and wheezing. Pertinent negatives include no abdominal pain, chest pain, diarrhea, dysuria, nausea or vomiting. He has tried antihistamine (Albuterol nebulizers daily) for the symptoms. The treatment provided mild relief.     Review of Systems   Constitutional: Positive for appetite change and fatigue.   HENT: Positive for congestion, ear pain, postnasal drip, rhinorrhea, sinus pressure, sinus pain, sneezing and sore throat.    Eyes: Negative for pain and redness.   Respiratory: Positive for cough, chest tightness, shortness of breath and wheezing. Negative for choking.    Cardiovascular: Negative for chest pain and palpitations.   Gastrointestinal: Negative for abdominal distention, abdominal pain, constipation, diarrhea, nausea and vomiting.   Endocrine: Negative for polydipsia and polyphagia.   Genitourinary: Negative for dysuria and hematuria.   Musculoskeletal: Negative for arthralgias, joint swelling and myalgias.   Skin: Negative for color change and pallor.   Neurological: Positive for headaches. Negative for dizziness and light-headedness.       Past medical, surgical, family and social history reviewed.  Objective:     Vitals:    02/20/19 1225   BP: 100/60   Pulse: 76   Resp: (!) 24   Temp: 97.7 °F (36.5 °C)   TempSrc: Oral   SpO2: (!) 94%   Weight: 47.3 kg (104 lb 6.2 oz)   Height: 5' 11" (1.803 m)   PainSc: 0-No pain     Body mass index is 14.56 kg/m².     Physical Exam   Constitutional: He is oriented to person, place, and time. He appears " well-developed.   Thin, elderly male   HENT:   Head: Normocephalic and atraumatic.   Right Ear: Hearing, tympanic membrane, external ear and ear canal normal.   Left Ear: Hearing, tympanic membrane, external ear and ear canal normal.   Nose: Mucosal edema and rhinorrhea present. No nose lacerations or sinus tenderness. Right sinus exhibits maxillary sinus tenderness and frontal sinus tenderness. Left sinus exhibits maxillary sinus tenderness and frontal sinus tenderness.   Mouth/Throat: Uvula is midline and mucous membranes are normal. Posterior oropharyngeal edema and posterior oropharyngeal erythema present.   Eyes: Conjunctivae are normal. Right eye exhibits no discharge. Left eye exhibits no discharge. No scleral icterus.   Neck: Normal range of motion. Neck supple. No tracheal deviation present.   Cardiovascular: Normal rate and regular rhythm.   No murmur heard.  Pulmonary/Chest: Effort normal. No stridor. No respiratory distress. He has wheezes. He has no rales. He exhibits no tenderness.   Wheezing to the upper lobes bilaterally upon inspiration and expiration   Musculoskeletal: Normal range of motion.   Lymphadenopathy:     He has cervical adenopathy.   Neurological: He is alert and oriented to person, place, and time.   Skin: Skin is warm and dry.   Psychiatric: He has a normal mood and affect. His behavior is normal. Judgment and thought content normal.       Assessment:       1. Sinusitis, unspecified chronicity, unspecified location    2. COPD exacerbation    3. Tobacco use disorder        Plan:       Rigoberto was seen today for cough and chest congestion.    Diagnoses and all orders for this visit:    Sinusitis, unspecified chronicity, unspecified location  -     doxycycline (VIBRAMYCIN) 100 MG Cap; Take 1 capsule (100 mg total) by mouth 2 (two) times daily. for 7 days    COPD exacerbation    -     predniSONE (DELTASONE) 20 MG tablet; Take 3 tablets daily for 3 days, then 2 tablets daily for 3 days,  then 1 tablet daily for 3 days  -     albuterol (PROVENTIL) 2.5 mg /3 mL (0.083 %) nebulizer solution; Take 3 mLs (2.5 mg total) by nebulization every 4 (four) hours as needed for Wheezing or Shortness of Breath.      Tobacco use disorder  Smoking cessation counseling provided.

## 2022-06-26 ENCOUNTER — HOSPITAL ENCOUNTER (EMERGENCY)
Facility: HOSPITAL | Age: 82
Discharge: HOME | End: 2022-06-26
Attending: FAMILY MEDICINE
Payer: COMMERCIAL

## 2022-06-26 VITALS
SYSTOLIC BLOOD PRESSURE: 141 MMHG | HEART RATE: 85 BPM | TEMPERATURE: 98 F | WEIGHT: 106.94 LBS | RESPIRATION RATE: 30 BRPM | OXYGEN SATURATION: 94 % | DIASTOLIC BLOOD PRESSURE: 64 MMHG | BODY MASS INDEX: 14.91 KG/M2

## 2022-06-26 DIAGNOSIS — U07.1 COVID: Primary | ICD-10-CM

## 2022-06-26 DIAGNOSIS — U07.1 COVID-19 VIRUS DETECTED: ICD-10-CM

## 2022-06-26 DIAGNOSIS — R06.02 SOB (SHORTNESS OF BREATH): ICD-10-CM

## 2022-06-26 DIAGNOSIS — R06.02 SHORTNESS OF BREATH: ICD-10-CM

## 2022-06-26 LAB
ALBUMIN SERPL BCP-MCNC: 3.5 G/DL (ref 3.5–5.2)
ALP SERPL-CCNC: 101 U/L (ref 55–135)
ALT SERPL W/O P-5'-P-CCNC: 14 U/L (ref 10–44)
ANION GAP SERPL CALC-SCNC: 12 MMOL/L (ref 8–16)
AST SERPL-CCNC: 22 U/L (ref 10–40)
BASOPHILS # BLD AUTO: 0.01 K/UL (ref 0–0.2)
BASOPHILS NFR BLD: 0.2 % (ref 0–1.9)
BILIRUB SERPL-MCNC: 0.5 MG/DL (ref 0.1–1)
BNP SERPL-MCNC: 270 PG/ML (ref 0–99)
BUN SERPL-MCNC: 26 MG/DL (ref 8–23)
CALCIUM SERPL-MCNC: 9.6 MG/DL (ref 8.7–10.5)
CHLORIDE SERPL-SCNC: 103 MMOL/L (ref 95–110)
CO2 SERPL-SCNC: 25 MMOL/L (ref 23–29)
CREAT SERPL-MCNC: 1.5 MG/DL (ref 0.5–1.4)
DIFFERENTIAL METHOD: ABNORMAL
EOSINOPHIL # BLD AUTO: 0 K/UL (ref 0–0.5)
EOSINOPHIL NFR BLD: 0.5 % (ref 0–8)
ERYTHROCYTE [DISTWIDTH] IN BLOOD BY AUTOMATED COUNT: 13.3 % (ref 11.5–14.5)
EST. GFR  (AFRICAN AMERICAN): 49 ML/MIN/1.73 M^2
EST. GFR  (NON AFRICAN AMERICAN): 43 ML/MIN/1.73 M^2
GLUCOSE SERPL-MCNC: 119 MG/DL (ref 70–110)
HCT VFR BLD AUTO: 39.3 % (ref 40–54)
HGB BLD-MCNC: 12.8 G/DL (ref 14–18)
IMM GRANULOCYTES # BLD AUTO: 0.03 K/UL (ref 0–0.04)
IMM GRANULOCYTES NFR BLD AUTO: 0.5 % (ref 0–0.5)
LYMPHOCYTES # BLD AUTO: 0.7 K/UL (ref 1–4.8)
LYMPHOCYTES NFR BLD: 11 % (ref 18–48)
MCH RBC QN AUTO: 30.1 PG (ref 27–31)
MCHC RBC AUTO-ENTMCNC: 32.6 G/DL (ref 32–36)
MCV RBC AUTO: 93 FL (ref 82–98)
MONOCYTES # BLD AUTO: 0.5 K/UL (ref 0.3–1)
MONOCYTES NFR BLD: 8.1 % (ref 4–15)
NEUTROPHILS # BLD AUTO: 4.8 K/UL (ref 1.8–7.7)
NEUTROPHILS NFR BLD: 79.7 % (ref 38–73)
NRBC BLD-RTO: 0 /100 WBC
PLATELET # BLD AUTO: 145 K/UL (ref 150–450)
PMV BLD AUTO: 10.5 FL (ref 9.2–12.9)
POTASSIUM SERPL-SCNC: 4.2 MMOL/L (ref 3.5–5.1)
PROT SERPL-MCNC: 8.1 G/DL (ref 6–8.4)
RBC # BLD AUTO: 4.25 M/UL (ref 4.6–6.2)
SARS-COV-2 RDRP RESP QL NAA+PROBE: POSITIVE
SODIUM SERPL-SCNC: 140 MMOL/L (ref 136–145)
TROPONIN I SERPL DL<=0.01 NG/ML-MCNC: <0.006 NG/ML (ref 0–0.03)
WBC # BLD AUTO: 6.07 K/UL (ref 3.9–12.7)

## 2022-06-26 PROCEDURE — 63600175 PHARM REV CODE 636 W HCPCS: Performed by: FAMILY MEDICINE

## 2022-06-26 PROCEDURE — 85025 COMPLETE CBC W/AUTO DIFF WBC: CPT | Performed by: REGISTERED NURSE

## 2022-06-26 PROCEDURE — 84484 ASSAY OF TROPONIN QUANT: CPT | Performed by: REGISTERED NURSE

## 2022-06-26 PROCEDURE — U0002 COVID-19 LAB TEST NON-CDC: HCPCS | Performed by: REGISTERED NURSE

## 2022-06-26 PROCEDURE — 93005 ELECTROCARDIOGRAM TRACING: CPT

## 2022-06-26 PROCEDURE — 25000242 PHARM REV CODE 250 ALT 637 W/ HCPCS: Performed by: FAMILY MEDICINE

## 2022-06-26 PROCEDURE — 93010 ELECTROCARDIOGRAM REPORT: CPT | Mod: ,,, | Performed by: INTERNAL MEDICINE

## 2022-06-26 PROCEDURE — 94640 AIRWAY INHALATION TREATMENT: CPT | Mod: XB

## 2022-06-26 PROCEDURE — 83880 ASSAY OF NATRIURETIC PEPTIDE: CPT | Performed by: REGISTERED NURSE

## 2022-06-26 PROCEDURE — 93010 EKG 12-LEAD: ICD-10-PCS | Mod: ,,, | Performed by: INTERNAL MEDICINE

## 2022-06-26 PROCEDURE — 96374 THER/PROPH/DIAG INJ IV PUSH: CPT

## 2022-06-26 PROCEDURE — 80053 COMPREHEN METABOLIC PANEL: CPT | Performed by: REGISTERED NURSE

## 2022-06-26 PROCEDURE — 99285 EMERGENCY DEPT VISIT HI MDM: CPT | Mod: 25

## 2022-06-26 RX ORDER — METHYLPREDNISOLONE SOD SUCC 125 MG
125 VIAL (EA) INJECTION
Status: COMPLETED | OUTPATIENT
Start: 2022-06-26 | End: 2022-06-26

## 2022-06-26 RX ORDER — PREDNISONE 20 MG/1
TABLET ORAL
Qty: 18 TABLET | Refills: 0 | Status: SHIPPED | OUTPATIENT
Start: 2022-06-26 | End: 2022-08-29

## 2022-06-26 RX ORDER — IPRATROPIUM BROMIDE AND ALBUTEROL SULFATE 2.5; .5 MG/3ML; MG/3ML
3 SOLUTION RESPIRATORY (INHALATION)
Status: COMPLETED | OUTPATIENT
Start: 2022-06-26 | End: 2022-06-26

## 2022-06-26 RX ORDER — ALBUTEROL SULFATE 0.83 MG/ML
SOLUTION RESPIRATORY (INHALATION)
Qty: 1 EACH | Refills: 1 | Status: SHIPPED | OUTPATIENT
Start: 2022-06-26 | End: 2023-03-06

## 2022-06-26 RX ADMIN — IPRATROPIUM BROMIDE AND ALBUTEROL SULFATE 3 ML: 2.5; .5 SOLUTION RESPIRATORY (INHALATION) at 06:06

## 2022-06-26 RX ADMIN — METHYLPREDNISOLONE SODIUM SUCCINATE 125 MG: 125 INJECTION, POWDER, FOR SOLUTION INTRAMUSCULAR; INTRAVENOUS at 06:06

## 2022-06-26 NOTE — FIRST PROVIDER EVALUATION
Medical screening exam completed.  I have conducted a focused provider triage encounter, findings are as follows:    Brief history of present illness:  Shortness of breath    Vitals:    06/26/22 1635   BP: 102/61   BP Location: Right arm   Patient Position: Sitting   Pulse: 70   Resp: (!) 22   Temp: 97.7 °F (36.5 °C)   TempSrc: Oral   SpO2: 96%   Weight: 48.5 kg (106 lb 14.8 oz)       Pertinent physical exam:  No acute distress    Brief workup plan:  Workup and further evaluation    Preliminary workup initiated; this workup will be continued and followed by the physician or advanced practice provider that is assigned to the patient when roomed.

## 2022-06-26 NOTE — ED PROVIDER NOTES
SCRIBE #1 NOTE: I, Jhon Garcia, am scribing for, and in the presence of, Idalia Murray MD. I have scribed the entire note.       History     Chief Complaint   Patient presents with    Shortness of Breath     SOB and weakness for past week.      Review of patient's allergies indicates:  No Known Allergies      History of Present Illness     HPI    6/26/2022, 6:22 PM  History obtained from the patient      History of Present Illness: Rigoberto Perez is a 81 y.o. male patient with a PMHx of COPD and Carotid Artery Disease who presents to the Emergency Department for evaluation of SOB which onset several days ago. Symptoms are constant and moderate in severity. No mitigating or exacerbating factors reported. Associated sxs include weakness. Patient denies any fever, chest pain, dysuria, chills, dizziness, N/V/D, back pain, and all other sxs at this time. No prior Tx reported. No further complaints or concerns at this time.       Arrival mode: Personal vehicle    PCP: Demond Fung MD        Past Medical History:  Past Medical History:   Diagnosis Date    Carotid artery disease      8/30/17 shows 50% stenosis    Cataract     COPD (chronic obstructive pulmonary disease)     History of basal cell cancer        Past Surgical History:  Past Surgical History:   Procedure Laterality Date    CATARACT EXTRACTION, BILATERAL           Family History:  Family History   Problem Relation Age of Onset    Diabetes Maternal Grandmother        Social History:  Social History     Tobacco Use    Smoking status: Current Every Day Smoker     Packs/day: 1.00     Types: Cigarettes    Smokeless tobacco: Never Used   Substance and Sexual Activity    Alcohol use: No    Drug use: No    Sexual activity: Never        Review of Systems     Review of Systems   Constitutional: Negative for chills and fever.   HENT: Negative for sore throat.    Respiratory: Positive for shortness of breath.    Cardiovascular: Negative for chest  pain.   Gastrointestinal: Negative for diarrhea, nausea and vomiting.   Genitourinary: Negative for dysuria.   Musculoskeletal: Negative for back pain.   Skin: Negative for rash.   Neurological: Positive for weakness. Negative for dizziness.   Hematological: Does not bruise/bleed easily.   All other systems reviewed and are negative.     Physical Exam     Initial Vitals [06/26/22 1635]   BP Pulse Resp Temp SpO2   102/61 70 (!) 22 97.7 °F (36.5 °C) 96 %      MAP       --          Physical Exam  Nursing Notes and Vital Signs Reviewed.  Constitutional: Patient is in mild distress. Pale and ill appearing.  Head: Atraumatic. Normocephalic.  Eyes: PERRL. EOM intact. Conjunctivae are not pale. No scleral icterus.  ENT: Mucous membranes are moist. Oropharynx is clear and symmetric.    Neck: Supple. Full ROM. No lymphadenopathy.  Cardiovascular: Regular rate. Regular rhythm. No murmurs, rubs, or gallops. Distal pulses are 2+ and symmetric.  Pulmonary/Chest: Diminished breathing sounds bilaterally. No wheezing or rales.  Abdominal: Soft and non-distended.  There is no tenderness.  No rebound, guarding, or rigidity.  Musculoskeletal: Moves all extremities. No obvious deformities. No edema. No calf tenderness.  Skin: Warm and dry.  Neurological:  Alert, awake, and appropriate.  Normal speech.  No acute focal neurological deficits are appreciated.  Psychiatric: Normal affect. Good eye contact. Appropriate in content.     ED Course   Procedures  ED Vital Signs:  Vitals:    06/26/22 1635 06/26/22 1800 06/26/22 1820 06/26/22 1825   BP: 102/61 (!) 144/61     Pulse: 70 70 70 68   Resp: (!) 22 20 16 18   Temp: 97.7 °F (36.5 °C)      TempSrc: Oral      SpO2: 96% 98% 99% 99%   Weight: 48.5 kg (106 lb 14.8 oz)       06/26/22 1832 06/26/22 1904 06/26/22 1952   BP:  136/63 (!) 141/64   Pulse: 71 79 85   Resp: 20 (!) 24 (!) 30   Temp:      TempSrc:      SpO2: 99% 95% (!) 94%   Weight:          Abnormal Lab Results:  Labs Reviewed   CBC W/  AUTO DIFFERENTIAL - Abnormal; Notable for the following components:       Result Value    RBC 4.25 (*)     Hemoglobin 12.8 (*)     Hematocrit 39.3 (*)     Platelets 145 (*)     Lymph # 0.7 (*)     Gran % 79.7 (*)     Lymph % 11.0 (*)     All other components within normal limits   COMPREHENSIVE METABOLIC PANEL - Abnormal; Notable for the following components:    Glucose 119 (*)     BUN 26 (*)     Creatinine 1.5 (*)     eGFR if  49 (*)     eGFR if non  43 (*)     All other components within normal limits   B-TYPE NATRIURETIC PEPTIDE - Abnormal; Notable for the following components:     (*)     All other components within normal limits   SARS-COV-2 RNA AMPLIFICATION, QUAL - Abnormal; Notable for the following components:    SARS-CoV-2 RNA, Amplification, Qual Positive (*)     All other components within normal limits   TROPONIN I        All Lab Results:  Results for orders placed or performed during the hospital encounter of 06/26/22   CBC auto differential   Result Value Ref Range    WBC 6.07 3.90 - 12.70 K/uL    RBC 4.25 (L) 4.60 - 6.20 M/uL    Hemoglobin 12.8 (L) 14.0 - 18.0 g/dL    Hematocrit 39.3 (L) 40.0 - 54.0 %    MCV 93 82 - 98 fL    MCH 30.1 27.0 - 31.0 pg    MCHC 32.6 32.0 - 36.0 g/dL    RDW 13.3 11.5 - 14.5 %    Platelets 145 (L) 150 - 450 K/uL    MPV 10.5 9.2 - 12.9 fL    Immature Granulocytes 0.5 0.0 - 0.5 %    Gran # (ANC) 4.8 1.8 - 7.7 K/uL    Immature Grans (Abs) 0.03 0.00 - 0.04 K/uL    Lymph # 0.7 (L) 1.0 - 4.8 K/uL    Mono # 0.5 0.3 - 1.0 K/uL    Eos # 0.0 0.0 - 0.5 K/uL    Baso # 0.01 0.00 - 0.20 K/uL    nRBC 0 0 /100 WBC    Gran % 79.7 (H) 38.0 - 73.0 %    Lymph % 11.0 (L) 18.0 - 48.0 %    Mono % 8.1 4.0 - 15.0 %    Eosinophil % 0.5 0.0 - 8.0 %    Basophil % 0.2 0.0 - 1.9 %    Differential Method Automated    Comprehensive metabolic panel   Result Value Ref Range    Sodium 140 136 - 145 mmol/L    Potassium 4.2 3.5 - 5.1 mmol/L    Chloride 103 95 - 110 mmol/L     CO2 25 23 - 29 mmol/L    Glucose 119 (H) 70 - 110 mg/dL    BUN 26 (H) 8 - 23 mg/dL    Creatinine 1.5 (H) 0.5 - 1.4 mg/dL    Calcium 9.6 8.7 - 10.5 mg/dL    Total Protein 8.1 6.0 - 8.4 g/dL    Albumin 3.5 3.5 - 5.2 g/dL    Total Bilirubin 0.5 0.1 - 1.0 mg/dL    Alkaline Phosphatase 101 55 - 135 U/L    AST 22 10 - 40 U/L    ALT 14 10 - 44 U/L    Anion Gap 12 8 - 16 mmol/L    eGFR if African American 49 (A) >60 mL/min/1.73 m^2    eGFR if non African American 43 (A) >60 mL/min/1.73 m^2   Brain natriuretic peptide   Result Value Ref Range     (H) 0 - 99 pg/mL   Troponin I   Result Value Ref Range    Troponin I <0.006 0.000 - 0.026 ng/mL   COVID-19 Rapid Screening   Result Value Ref Range    SARS-CoV-2 RNA, Amplification, Qual Positive (A) Negative       Imaging Results:  Imaging Results          X-Ray Chest 1 View (Final result)  Result time 06/26/22 17:18:01    Final result by Harry Dodson MD (06/26/22 17:18:01)                 Impression:      Mild right-sided pleural effusion.  Hyperexpanded lungs suggestive of element of emphysema.      Electronically signed by: West Mcdonald  Date:    06/26/2022  Time:    17:18             Narrative:    EXAMINATION:  XR CHEST 1 VIEW    CLINICAL HISTORY:  Shortness of breath    TECHNIQUE:  Single frontal view of the chest was performed.    COMPARISON:  None    FINDINGS:  Hyperinflation with small trace right-sided pleural effusion.  Findings may relate to emphysema.No pneumothorax.  Minimal reticular opacities.    The cardiac silhouette is normal in size. The hilar and mediastinal contours are unremarkable.    Bones are intact.                                 The EKG was ordered, reviewed, and independently interpreted by the ED provider.  Interpretation time: 16:34  Rate: 91 BPM  Rhythm: normal sinus rhythm with frequent premature ventricular complexes  Interpretation: LVH with repolarization abnormality. No STEMI.  When compared to EKG performed 10/12/17, Premature  ventricular complexes now present. ST now depressed in inferior leads. ST now depressed in Anterior leads.           The Emergency Provider reviewed the vital signs and test results, which are outlined above.     ED Discussion     6:28 PM: Reassessed pt at this time. Discussed with pt all pertinent ED information and results. Discussed pt dx and plan of tx. Gave pt all f/u and return to the ED instructions. All questions and concerns were addressed at this time. Pt expresses understanding of information and instructions, and is comfortable with plan to discharge. Pt is stable for discharge.    I discussed with patient and/or family/caretaker that evaluation in the ED does not suggest any emergent or life threatening medical conditions requiring immediate intervention beyond what was provided in the ED, and I believe patient is safe for discharge.  Regardless, an unremarkable evaluation in the ED does not preclude the development or presence of a serious of life threatening condition. As such, patient was instructed to return immediately for any worsening or change in current symptoms.       Medical Decision Making:   Clinical Tests:   Lab Tests: Ordered and Reviewed  Radiological Study: Ordered and Reviewed  Medical Tests: Ordered and Reviewed           ED Medication(s):  Medications   albuterol-ipratropium 2.5 mg-0.5 mg/3 mL nebulizer solution 3 mL (3 mLs Nebulization Given 6/26/22 1832)   methylPREDNISolone sodium succinate injection 125 mg (125 mg Intravenous Given 6/26/22 1821)       Discharge Medication List as of 6/26/2022  6:05 PM           Follow-up Information     Schedule an appointment as soon as possible for a visit  with Demond Fung MD.    Specialty: Family Medicine  Why: As needed  Contact information:  [id number] [id number] [phone]                             Scribe Attestation:   Scribe #1: I performed the above scribed service and the documentation accurately describes the  services I performed. I attest to the accuracy of the note.     Attending:   Physician Attestation Statement for Scribe #1: I, Idalia Murray MD, personally performed the services described in this documentation, as scribed by Jhon Garcia, in my presence, and it is both accurate and complete.           Clinical Impression       ICD-10-CM ICD-9-CM   1. COVID  U07.1 079.89   2. SOB (shortness of breath)  R06.02 786.05   3. Shortness of breath  R06.02 786.05       Disposition:   Disposition: Discharged  Condition: Stable       Idalia Murray MD  06/27/22 [id number]

## 2022-08-29 ENCOUNTER — HOSPITAL ENCOUNTER (OUTPATIENT)
Dept: CARDIOLOGY | Facility: HOSPITAL | Age: 82
Discharge: HOME | End: 2022-08-29
Attending: INTERNAL MEDICINE
Payer: COMMERCIAL

## 2022-08-29 ENCOUNTER — HOSPITAL ENCOUNTER (OUTPATIENT)
Dept: RADIOLOGY | Facility: HOSPITAL | Age: 82
Discharge: HOME | End: 2022-08-29
Attending: INTERNAL MEDICINE
Payer: COMMERCIAL

## 2022-08-29 ENCOUNTER — OFFICE VISIT (OUTPATIENT)
Dept: PRIMARY CARE CLINIC | Facility: CLINIC | Age: 82
End: 2022-08-29
Payer: COMMERCIAL

## 2022-08-29 VITALS
HEIGHT: 71 IN | WEIGHT: 108 LBS | DIASTOLIC BLOOD PRESSURE: 77 MMHG | BODY MASS INDEX: 15.12 KG/M2 | SYSTOLIC BLOOD PRESSURE: 177 MMHG

## 2022-08-29 VITALS
TEMPERATURE: 98 F | BODY MASS INDEX: 15.16 KG/M2 | WEIGHT: 108.69 LBS | DIASTOLIC BLOOD PRESSURE: 74 MMHG | SYSTOLIC BLOOD PRESSURE: 122 MMHG | OXYGEN SATURATION: 97 % | HEART RATE: 88 BPM

## 2022-08-29 DIAGNOSIS — R63.6 UNDERWEIGHT: ICD-10-CM

## 2022-08-29 DIAGNOSIS — B35.1 ONYCHOMYCOSIS: ICD-10-CM

## 2022-08-29 DIAGNOSIS — I50.9 CONGESTIVE HEART FAILURE, UNSPECIFIED HF CHRONICITY, UNSPECIFIED HEART FAILURE TYPE: ICD-10-CM

## 2022-08-29 DIAGNOSIS — I67.9 CEREBRAL VASCULAR DISEASE: Chronic | ICD-10-CM

## 2022-08-29 DIAGNOSIS — I73.9 CLAUDICATION, INTERMITTENT: ICD-10-CM

## 2022-08-29 DIAGNOSIS — B35.3 TINEA PEDIS OF BOTH FEET: ICD-10-CM

## 2022-08-29 DIAGNOSIS — H53.9 VISION CHANGES: ICD-10-CM

## 2022-08-29 DIAGNOSIS — F17.200 TOBACCO USE DISORDER: ICD-10-CM

## 2022-08-29 DIAGNOSIS — D51.8 VITAMIN B12 DEFICIENCY (DIETARY) ANEMIA: ICD-10-CM

## 2022-08-29 DIAGNOSIS — H61.23 BILATERAL IMPACTED CERUMEN: ICD-10-CM

## 2022-08-29 DIAGNOSIS — E78.5 HYPERLIPIDEMIA, UNSPECIFIED HYPERLIPIDEMIA TYPE: ICD-10-CM

## 2022-08-29 DIAGNOSIS — N18.30 STAGE 3 CHRONIC KIDNEY DISEASE, UNSPECIFIED WHETHER STAGE 3A OR 3B CKD: ICD-10-CM

## 2022-08-29 DIAGNOSIS — E04.1 THYROID NODULE: ICD-10-CM

## 2022-08-29 DIAGNOSIS — J44.9 CHRONIC OBSTRUCTIVE PULMONARY DISEASE, UNSPECIFIED COPD TYPE: Primary | ICD-10-CM

## 2022-08-29 DIAGNOSIS — Z11.59 ENCOUNTER FOR HEPATITIS C SCREENING TEST FOR LOW RISK PATIENT: ICD-10-CM

## 2022-08-29 DIAGNOSIS — R73.03 PREDIABETES: ICD-10-CM

## 2022-08-29 DIAGNOSIS — D69.6 THROMBOCYTOPENIA: ICD-10-CM

## 2022-08-29 LAB
LEFT ABI: 0.79
LEFT ARM BP: 182 MMHG
LEFT DORSALIS PEDIS: 143 MMHG
LEFT POSTERIOR TIBIAL: 144 MMHG
LEFT TBI: 0.6
LEFT TOE PRESSURE: 109 MMHG
RIGHT ABI: 0.85
RIGHT ARM BP: 177 MMHG
RIGHT DORSALIS PEDIS: 155 MMHG
RIGHT POSTERIOR TIBIAL: 138 MMHG
RIGHT TBI: 0.53
RIGHT TOE PRESSURE: 97 MMHG

## 2022-08-29 PROCEDURE — 1159F PR MEDICATION LIST DOCUMENTED IN MEDICAL RECORD: ICD-10-PCS | Mod: CPTII,S$GLB,, | Performed by: INTERNAL MEDICINE

## 2022-08-29 PROCEDURE — 99999 PR PBB SHADOW E&M-EST. PATIENT-LVL V: CPT | Mod: PBBFAC,,, | Performed by: INTERNAL MEDICINE

## 2022-08-29 PROCEDURE — 99499 RISK ADDL DX/OHS AUDIT: ICD-10-PCS | Mod: S$PBB,,, | Performed by: INTERNAL MEDICINE

## 2022-08-29 PROCEDURE — 76536 US SOFT TISSUE HEAD NECK THYROID: ICD-10-PCS | Mod: 26,,, | Performed by: RADIOLOGY

## 2022-08-29 PROCEDURE — 1125F AMNT PAIN NOTED PAIN PRSNT: CPT | Mod: CPTII,S$GLB,, | Performed by: INTERNAL MEDICINE

## 2022-08-29 PROCEDURE — 3074F PR MOST RECENT SYSTOLIC BLOOD PRESSURE < 130 MM HG: ICD-10-PCS | Mod: CPTII,S$GLB,, | Performed by: INTERNAL MEDICINE

## 2022-08-29 PROCEDURE — 1101F PT FALLS ASSESS-DOCD LE1/YR: CPT | Mod: CPTII,S$GLB,, | Performed by: INTERNAL MEDICINE

## 2022-08-29 PROCEDURE — 99215 OFFICE O/P EST HI 40 MIN: CPT | Mod: S$GLB,,, | Performed by: INTERNAL MEDICINE

## 2022-08-29 PROCEDURE — 99417 PR PROLONGED SVC, OUTPT, W/WO DIRECT PT CONTACT,  EA ADDTL 15 MIN: ICD-10-PCS | Mod: S$GLB,,, | Performed by: INTERNAL MEDICINE

## 2022-08-29 PROCEDURE — 1125F PR PAIN SEVERITY QUANTIFIED, PAIN PRESENT: ICD-10-PCS | Mod: CPTII,S$GLB,, | Performed by: INTERNAL MEDICINE

## 2022-08-29 PROCEDURE — 1159F MED LIST DOCD IN RCRD: CPT | Mod: CPTII,S$GLB,, | Performed by: INTERNAL MEDICINE

## 2022-08-29 PROCEDURE — 3288F FALL RISK ASSESSMENT DOCD: CPT | Mod: CPTII,S$GLB,, | Performed by: INTERNAL MEDICINE

## 2022-08-29 PROCEDURE — 99499 UNLISTED E&M SERVICE: CPT | Mod: S$PBB,,, | Performed by: INTERNAL MEDICINE

## 2022-08-29 PROCEDURE — 99215 PR OFFICE/OUTPT VISIT, EST, LEVL V, 40-54 MIN: ICD-10-PCS | Mod: S$GLB,,, | Performed by: INTERNAL MEDICINE

## 2022-08-29 PROCEDURE — 99417 PROLNG OP E/M EACH 15 MIN: CPT | Mod: S$GLB,,, | Performed by: INTERNAL MEDICINE

## 2022-08-29 PROCEDURE — 3078F DIAST BP <80 MM HG: CPT | Mod: CPTII,S$GLB,, | Performed by: INTERNAL MEDICINE

## 2022-08-29 PROCEDURE — 3078F PR MOST RECENT DIASTOLIC BLOOD PRESSURE < 80 MM HG: ICD-10-PCS | Mod: CPTII,S$GLB,, | Performed by: INTERNAL MEDICINE

## 2022-08-29 PROCEDURE — 76536 US EXAM OF HEAD AND NECK: CPT | Mod: 26,,, | Performed by: RADIOLOGY

## 2022-08-29 PROCEDURE — 93922 UPR/L XTREMITY ART 2 LEVELS: CPT

## 2022-08-29 PROCEDURE — 76536 US EXAM OF HEAD AND NECK: CPT | Mod: TC

## 2022-08-29 PROCEDURE — 1101F PR PT FALLS ASSESS DOC 0-1 FALLS W/OUT INJ PAST YR: ICD-10-PCS | Mod: CPTII,S$GLB,, | Performed by: INTERNAL MEDICINE

## 2022-08-29 PROCEDURE — 99999 PR PBB SHADOW E&M-EST. PATIENT-LVL V: ICD-10-PCS | Mod: PBBFAC,,, | Performed by: INTERNAL MEDICINE

## 2022-08-29 PROCEDURE — 93922 ANKLE BRACHIAL INDICES (ABI): ICD-10-PCS | Mod: 26,,, | Performed by: INTERNAL MEDICINE

## 2022-08-29 PROCEDURE — 3074F SYST BP LT 130 MM HG: CPT | Mod: CPTII,S$GLB,, | Performed by: INTERNAL MEDICINE

## 2022-08-29 PROCEDURE — 93922 UPR/L XTREMITY ART 2 LEVELS: CPT | Mod: 26,,, | Performed by: INTERNAL MEDICINE

## 2022-08-29 PROCEDURE — 3288F PR FALLS RISK ASSESSMENT DOCUMENTED: ICD-10-PCS | Mod: CPTII,S$GLB,, | Performed by: INTERNAL MEDICINE

## 2022-08-29 RX ORDER — FLUTICASONE FUROATE, UMECLIDINIUM BROMIDE AND VILANTEROL TRIFENATATE 100; 62.5; 25 UG/1; UG/1; UG/1
1 POWDER RESPIRATORY (INHALATION) DAILY
Qty: 60 EACH | Refills: 5 | Status: SHIPPED | OUTPATIENT
Start: 2022-08-29 | End: 2023-01-30 | Stop reason: SDUPTHER

## 2022-08-29 RX ORDER — CLOTRIMAZOLE 1 %
CREAM (GRAM) TOPICAL 2 TIMES DAILY
Qty: 24 G | Refills: 1 | Status: SHIPPED | OUTPATIENT
Start: 2022-08-29 | End: 2023-06-06

## 2022-08-29 RX ORDER — ASPIRIN 81 MG/1
81 TABLET ORAL DAILY
Qty: 30 TABLET | Refills: 5 | Status: SHIPPED | OUTPATIENT
Start: 2022-08-29

## 2022-08-29 RX ORDER — FLUTICASONE FUROATE, UMECLIDINIUM BROMIDE AND VILANTEROL TRIFENATATE 100; 62.5; 25 UG/1; UG/1; UG/1
1 POWDER RESPIRATORY (INHALATION) DAILY
COMMUNITY
Start: 2022-06-07 | End: 2022-08-29 | Stop reason: SDUPTHER

## 2022-08-29 NOTE — PROGRESS NOTES
"Rigoberto Perez  08/29/2022  [id number]    Trinidad Bai MD  Patient Care Team:  Trinidad Bai MD as PCP - General (Internal Medicine)  Dwight Rubio MD as Consulting Physician (Otolaryngology)  Joan Cadena NP as Nurse Practitioner (Family Medicine)  Alice Ruiz NP as Nurse Practitioner (Family Medicine)    Visit Type: Establish Care    Chief Complaint:  Chief Complaint   Patient presents with    COPD    Establish Care     "I'm fine - just a little tired"  C/o leg hurting - don't hurt all the time - hurt after pushing Mrs. Perez's heavy wheelchair.    History of Present Illness: Mr. Rigoberto Perez is an 81 year old male Medical issues include nicotine cigarette dependence, COPD, CAD w normal NST 2021, diastolic CHF, h/o skin cancer. Chronic exertional dyspnea. He and his wife are now living with their son here in [place] and need to establish medical care in the area. Previous PCP listed as Dr. Demond Fung but no recent PCP visits. Most recent was with DEYSI Cadena, 2/20/2019.    Doesn't like to go to the doctor. Used to be a contractor - mostly cabinent work. Now "push my wife around".    H/o social alcohol - sometimes heavy - quit 40 years ago. Has been smoking for 70 years - pack a day past many years.    Used to weight 140 lbs - lost 30 lbs over past 2 years    Hosp/ED/Urgent Care:  6/26/22 ED [facility]  6/20/22 ED [facility] syncope    Recent appointments:   3/17/21 Cardiology Dr. Areli Ghosh  2/18/20 Pulmonology Dr. Fuad Ghosh     The following were reviewed: Active problem list, medication list, allergies, family history, social history, and Health Maintenance.     History:  Past Medical History:   Diagnosis Date    Carotid artery disease      8/30/17 shows 50% stenosis    Cataract     COPD (chronic obstructive pulmonary disease)     History of basal cell cancer      Past Surgical History:   Procedure Laterality Date    CATARACT EXTRACTION, BILATERAL       Family History " "  Problem Relation Age of Onset    Diabetes Maternal Grandmother      Social History     Socioeconomic History    Marital status:    Tobacco Use    Smoking status: Every Day     Packs/day: 1.00     Years: 70.00     Pack years: 70.00     Types: Cigarettes    Smokeless tobacco: Never   Substance and Sexual Activity    Alcohol use: No    Drug use: No    Sexual activity: Never     Patient Active Problem List   Diagnosis    Chronic obstructive pulmonary disease    Tobacco use disorder    Anemia due to stage 3a chronic kidney disease    Encounter for hepatitis C screening test for low risk patient    Hyperlipidemia    Thrombocytopenia    Claudication, intermittent    Onychomycosis    Tinea pedis of both feet    Centrilobular emphysema    Underweight    B12 deficiency    Congestive heart failure    Prediabetes    Thyroid nodule    Vitamin B12 deficiency (dietary) anemia    Bilateral impacted cerumen    Cerebral vascular disease     Review of patient's allergies indicates:  No Known Allergies    Medications:  Current Outpatient Medications on File Prior to Visit   Medication Sig Dispense Refill    albuterol (PROVENTIL) 2.5 mg /3 mL (0.083 %) nebulizer solution Take 3 mLs (2.5 mg total) by nebulization every 4 (four) hours as needed for Wheezing or Shortness of Breath. 1 each 1    nebulizer and compressor Ivon Use as directed       No current facility-administered medications on file prior to visit.       Medications have been reviewed and reconciled with patient at visit today.    Barriers to medications present (no )    Adverse reactions to current medications (no)    Review of Systems   Constitutional:  Positive for fatigue. Negative for appetite change and unexpected weight change.   HENT:  Positive for hearing loss. Negative for nasal congestion, dental problem and sinus pressure/congestion.    Eyes:  Positive for visual disturbance. Negative for photophobia and pain.        Eye sight "bad"   Respiratory:  " Positive for shortness of breath and wheezing. Negative for chest tightness.         Seldom cough - lot of phlegm 2-3x week   Cardiovascular:  Positive for claudication. Negative for chest pain, palpitations and leg swelling.   Gastrointestinal:  Negative for abdominal pain, constipation, diarrhea, nausea and vomiting.   Genitourinary:  Negative for bladder incontinence, difficulty urinating, dysuria, frequency, hematuria and urgency.        Rare nocturia   Musculoskeletal:  Positive for leg pain. Negative for arthralgias.   Integumentary:  Negative for rash and wound.        Skin changes   Neurological:  Positive for syncope and memory loss. Negative for dizziness, vertigo, tremors, weakness, light-headedness and numbness.        ED for syncopal episode x 1 but not a usual occurence   Hematological:  Bruises/bleeds easily.   Psychiatric/Behavioral:  Negative for dysphoric mood and sleep disturbance. The patient is not nervous/anxious.       Exam:  Vitals:    08/29/22 1038   BP: 122/74   Pulse: 88   Temp: 98 °F (36.7 °C)     Weight: 49.3 kg (108 lb 11 oz)   Body mass index is 15.16 kg/m².       BP Readings from Last 3 Encounters:   08/29/22 (!) 177/77   08/29/22 122/74   06/26/22 (!) 141/64        Physical Exam  Vitals reviewed.   Constitutional:       General: He is not in acute distress.     Appearance: He is ill-appearing.      Comments: underweight   HENT:      Head: Normocephalic and atraumatic.      Nose: Nose normal.   Eyes:      General: No scleral icterus.        Right eye: No discharge.         Left eye: No discharge.      Extraocular Movements: Extraocular movements intact.   Cardiovascular:      Rate and Rhythm: Normal rate and regular rhythm.   Abdominal:      General: Abdomen is flat. Bowel sounds are normal.      Tenderness: There is no abdominal tenderness.   Skin:     General: Skin is warm and dry.   Neurological:      General: No focal deficit present.      Mental Status: He is alert. Mental status  is at baseline.   Psychiatric:         Mood and Affect: Mood normal.         Behavior: Behavior normal.      Laboratory Reviewed: (Yes)  Lab Results   Component Value Date    WBC 7.94 08/29/2022    HGB 11.6 (L) 08/29/2022    HCT 36.4 (L) 08/29/2022     08/29/2022    MCV 95 08/29/2022    CHOL 188 08/29/2022    TRIG 93 08/29/2022    HDL 50 08/29/2022    LDLCALC 119.4 08/29/2022    ALT 14 06/26/2022    AST 22 06/26/2022     08/29/2022    K 4.9 08/29/2022     08/29/2022    CREATININE 1.8 (H) 08/29/2022    BUN 27 (H) 08/29/2022    CO2 26 08/29/2022    MG 2.0 08/29/2022    TSH 1.404 08/29/2022    FREET4 1.05 08/29/2022    HGBA1C 5.7 (H) 08/29/2022     Labs 8/29/22: eGFR 37.1 mma 0.43 (mildly elevated) HCV NR PTHi 42.4   6/26/22:  eGFR 43 ua protein 30 hgb trace     US Thyroid 8/29/22: Small thyroid with mild diffuse parenchymal heterogeneity, possibly related to chronic sequela of thyroiditis. Multiple small benign-appearing thyroid nodules as above. No findings meeting TIRADS criteria for biopsy or follow-up.    HILDA 8/29/22: Rt hilda and waveforms suggestive of mild disease. Left hilda and waveforms suggestive of moderate disease.    ECG 6/26/22: Sinus rhythm with frequent Premature ventricular complexes   LVH with repolarization abnormality     CXR 6/26/22: Hyperinflation with small trace right-sided pleural effusion. Findings may relate to emphysema. Minimal reticular opacities. The cardiac silhouette is normal in size. The hilar and mediastinal contours are unremarkable. Bones are intact.    MRI Brain 1/20/18: No acute intracranial abnormality is seen. Moderate cerebral atrophy and nonspecific, chronic, supratentorial white matter disease. This most commonly reflects small vessel disease such as arteriolosclerosis.    Assessment:   82 y.o. male with multiple co-morbid illnesses here for continued follow up of medical problems.      The primary encounter diagnosis was Chronic obstructive pulmonary  disease, unspecified COPD type. Diagnoses of Stage 3 chronic kidney disease, unspecified whether stage 3a or 3b CKD, Hyperlipidemia, unspecified hyperlipidemia type, Encounter for hepatitis C screening test for low risk patient, Thrombocytopenia, Claudication, intermittent, Vitamin B12 deficiency (dietary) anemia, Prediabetes, Thyroid nodule, Onychomycosis, Vision changes, Tinea pedis of both feet, Congestive heart failure, unspecified HF chronicity, unspecified heart failure type, Tobacco use disorder, Underweight, Bilateral impacted cerumen, and Cerebral vascular disease were also pertinent to this visit.      Plan:     Problem List Items Addressed This Visit          Neuro    Cerebral vascular disease (Chronic)     Noted on imaging - consider cognitive testing (or at least screen) - discuss mitigation measures such as improving BP management, adding statin - consider carotid US?            ENT    Bilateral impacted cerumen     Debrox ordered - recheck next clinic f/u            Derm    Onychomycosis     Referral to podiatry         Relevant Orders    Ambulatory referral/consult to Podiatry    Tinea pedis of both feet     Topical Rx - referral to Podiatry            Pulmonary    Chronic obstructive pulmonary disease - Primary     Controller inhaler added - advised to use daily - not open to quitting or cutting back on smoking at this time            Cardiac/Vascular    Hyperlipidemia     Discuss adding statin         Relevant Orders    Lipid Panel (Completed)    Claudication, intermittent     Recommend add statin          Relevant Orders    Ankle Brachial Indices (HILDA) (Completed)    Congestive heart failure     Recommend echocardiogram for further evaluation based on elevated BNP, LVH              ID    Encounter for hepatitis C screening test for low risk patient     Non-reactive         Relevant Orders    HEPATITIS C ANTIBODY (Completed)       Hematology    Thrombocytopenia    Relevant Orders    CBC Auto  Differential (Completed)       Oncology    Anemia due to stage 3a chronic kidney disease     Mild, normocytic - consider check iron studies - discuss adding ARB         Vitamin B12 deficiency (dietary) anemia     Based on mildly elevated mma - consider also check B12 level (however may be falsely elevated if w hepatic pathology)  recommend add oral B12 supplement and recheck at future date         Relevant Orders    Methylmalonic Acid, Serum (Completed)       Endocrine    Underweight     Sig weight loss reported over past 2-6 years - cont monitor - consider further imaging: CT chest/abd/pelvis          Prediabetes     Discuss at next f/u - if crt improves, back to baseline, consider add low dose metformin?         Relevant Orders    Hemoglobin A1C (Completed)    Thyroid nodule      H/o thyroiditis? TSH/free T4 wnl          Relevant Orders    TSH (Completed)    US Soft Tissue Head Neck Thyroid (Completed)    T4, Free (Completed)       Other    Tobacco use disorder     Not motivated to quit or cut back at this time - COPD/emphysema - Chronic bronchitis? - recommend CT chest for f/u abnormal CXR and lung cancer screening          Other Visit Diagnoses       Vision changes        Relevant Orders    Ambulatory referral/consult to Ophthalmology            Health Maintenance         Date Due Completion Date    TETANUS VACCINE Never done ---    Shingles Vaccine (1 of 2) Never done ---    Pneumococcal Vaccines (Age 65+) (2 - PPSV23 or PCV20) 02/08/2019 2/8/2018    Lipid Panel 01/23/2020 1/23/2019    COVID-19 Vaccine (3 - Booster for Pfizer series) 07/18/2021 2/18/2021    Influenza Vaccine (1) 09/01/2022 9/18/2018            -Some of patient's prior imaging and lab results were reviewed and discussed with patient  -Possible treatment options and alternatives were discussed with the patient. Patient expressed understanding. Patient was given the opportunity to ask questions and be an active participant in their medical care.  Patient had no further questions or concerns at this time.   -Documentation of patient's health and condition was also obtained from family member who was present during visit.  -Patient is an overall moderate risk for health complications from their medical conditions.     Follow up: Follow up in about 4 weeks (around 9/26/2022) for Follow Up.    After visit summary printed and given to patient upon discharge.  Patient care plan included in After visit summary.    TOTAL TIME evaluating and managing this patient for this encounter was greater than 90 minutes. This time was spent personally by me on the following activities: review of patient's past medical history, assessing age-appropriate health maintenance needs, review of any interval history, review and interpretation of lab results, review and interpretation of imaging test results, review and interpretation of cardiology test results, reviewing consulting specialist notes, obtaining history from the patient and family, examination of the patient, medication reconciliation, managing and/or ordering prescription medications, ordering imaging tests, ordering referral to subspecialty provider(s), educating patient and answering their questions about diagnosis, treatment plan, and goals of treatment, discussing planned follow-up and final documentation of the visit. This time was exclusive of any separately billable procedures for this patient and exclusive of time spent treating any other patients.

## 2022-08-29 NOTE — PATIENT INSTRUCTIONS
Lab work today    Please clean and dry well between toes - apply lotion 2x daily for 2 -4 weeks (until skin healed plus 1 week)    Will call you with lab (and other) results

## 2022-09-20 PROBLEM — R73.03 PREDIABETES: Status: ACTIVE | Noted: 2022-08-29

## 2022-09-20 PROBLEM — R73.03 PREDIABETES: Status: ACTIVE | Noted: 2022-09-20

## 2022-09-20 PROBLEM — E04.1 THYROID NODULE: Status: ACTIVE | Noted: 2022-09-20

## 2022-09-20 PROBLEM — D63.1 ANEMIA DUE TO STAGE 3A CHRONIC KIDNEY DISEASE: Status: ACTIVE | Noted: 2022-08-29

## 2022-09-20 PROBLEM — H61.23 BILATERAL IMPACTED CERUMEN: Status: ACTIVE | Noted: 2022-08-29

## 2022-09-20 PROBLEM — E04.1 THYROID NODULE: Status: ACTIVE | Noted: 2022-08-29

## 2022-09-20 PROBLEM — J43.2 CENTRILOBULAR EMPHYSEMA: Status: ACTIVE | Noted: 2020-01-25

## 2022-09-20 PROBLEM — D51.8 VITAMIN B12 DEFICIENCY (DIETARY) ANEMIA: Status: ACTIVE | Noted: 2022-08-29

## 2022-09-20 PROBLEM — I67.9 CEREBRAL VASCULAR DISEASE: Chronic | Status: ACTIVE | Noted: 2022-08-29

## 2022-09-20 PROBLEM — R63.6 UNDERWEIGHT: Status: ACTIVE | Noted: 2018-08-21

## 2022-09-20 PROBLEM — N18.31 ANEMIA DUE TO STAGE 3A CHRONIC KIDNEY DISEASE: Status: ACTIVE | Noted: 2022-08-29

## 2022-09-20 PROBLEM — I50.9 CONGESTIVE HEART FAILURE: Status: ACTIVE | Noted: 2022-08-29

## 2022-09-20 PROBLEM — D51.8 VITAMIN B12 DEFICIENCY (DIETARY) ANEMIA: Status: ACTIVE | Noted: 2022-09-20

## 2022-09-20 PROBLEM — E53.8 B12 DEFICIENCY: Status: ACTIVE | Noted: 2022-08-29

## 2022-09-20 NOTE — ASSESSMENT & PLAN NOTE
Controller inhaler added - advised to use daily - not open to quitting or cutting back on smoking at this time

## 2022-09-20 NOTE — ASSESSMENT & PLAN NOTE
Based on mildly elevated mma - consider also check B12 level (however may be falsely elevated if w hepatic pathology)  recommend add oral B12 supplement and recheck at future date

## 2022-09-20 NOTE — ASSESSMENT & PLAN NOTE
Not motivated to quit or cut back at this time - COPD/emphysema - Chronic bronchitis? - recommend CT chest for f/u abnormal CXR and lung cancer screening

## 2022-09-20 NOTE — ASSESSMENT & PLAN NOTE
Sig weight loss reported over past 2-6 years - cont monitor - consider further imaging: CT chest/abd/pelvis

## 2022-09-20 NOTE — ASSESSMENT & PLAN NOTE
Noted on imaging - consider cognitive testing (or at least screen) - discuss mitigation measures such as improving BP management, adding statin - consider carotid US?

## 2022-10-14 ENCOUNTER — OFFICE VISIT (OUTPATIENT)
Dept: PRIMARY CARE CLINIC | Facility: CLINIC | Age: 82
End: 2022-10-14
Payer: COMMERCIAL

## 2022-10-14 VITALS
OXYGEN SATURATION: 97 % | HEART RATE: 43 BPM | HEIGHT: 71 IN | WEIGHT: 107.63 LBS | DIASTOLIC BLOOD PRESSURE: 62 MMHG | SYSTOLIC BLOOD PRESSURE: 131 MMHG | BODY MASS INDEX: 15.07 KG/M2 | TEMPERATURE: 98 F

## 2022-10-14 DIAGNOSIS — D63.1 ANEMIA DUE TO STAGE 3A CHRONIC KIDNEY DISEASE: ICD-10-CM

## 2022-10-14 DIAGNOSIS — H61.23 BILATERAL IMPACTED CERUMEN: Primary | ICD-10-CM

## 2022-10-14 DIAGNOSIS — I50.9 CONGESTIVE HEART FAILURE, UNSPECIFIED HF CHRONICITY, UNSPECIFIED HEART FAILURE TYPE: ICD-10-CM

## 2022-10-14 DIAGNOSIS — I67.9 CEREBROVASCULAR DISEASE: ICD-10-CM

## 2022-10-14 DIAGNOSIS — R63.6 UNDERWEIGHT: ICD-10-CM

## 2022-10-14 DIAGNOSIS — E78.00 PURE HYPERCHOLESTEROLEMIA: ICD-10-CM

## 2022-10-14 DIAGNOSIS — F17.218 NICOTINE DEPENDENCE, CIGARETTES, WITH OTHER NICOTINE-INDUCED DISORDERS: ICD-10-CM

## 2022-10-14 DIAGNOSIS — D51.8 VITAMIN B12 DEFICIENCY (DIETARY) ANEMIA: Chronic | ICD-10-CM

## 2022-10-14 DIAGNOSIS — R73.03 PREDIABETES: ICD-10-CM

## 2022-10-14 DIAGNOSIS — Z85.828 HISTORY OF BASAL CELL CANCER: ICD-10-CM

## 2022-10-14 DIAGNOSIS — N18.31 ANEMIA DUE TO STAGE 3A CHRONIC KIDNEY DISEASE: ICD-10-CM

## 2022-10-14 DIAGNOSIS — J43.2 CENTRILOBULAR EMPHYSEMA: ICD-10-CM

## 2022-10-14 DIAGNOSIS — I77.9 CAROTID ARTERY DISEASE, UNSPECIFIED LATERALITY, UNSPECIFIED TYPE: ICD-10-CM

## 2022-10-14 PROCEDURE — 3288F FALL RISK ASSESSMENT DOCD: CPT | Mod: CPTII,S$GLB,, | Performed by: INTERNAL MEDICINE

## 2022-10-14 PROCEDURE — 90472 PNEUMOCOCCAL CONJUGATE VACCINE 20-VALENT: ICD-10-PCS | Mod: S$GLB,,, | Performed by: INTERNAL MEDICINE

## 2022-10-14 PROCEDURE — 99215 PR OFFICE/OUTPT VISIT, EST, LEVL V, 40-54 MIN: ICD-10-PCS | Mod: 25,S$GLB,, | Performed by: INTERNAL MEDICINE

## 2022-10-14 PROCEDURE — 1159F MED LIST DOCD IN RCRD: CPT | Mod: CPTII,S$GLB,, | Performed by: INTERNAL MEDICINE

## 2022-10-14 PROCEDURE — 90472 IMMUNIZATION ADMIN EACH ADD: CPT | Mod: S$GLB,,, | Performed by: INTERNAL MEDICINE

## 2022-10-14 PROCEDURE — 90677 PCV20 VACCINE IM: CPT | Mod: S$GLB,,, | Performed by: INTERNAL MEDICINE

## 2022-10-14 PROCEDURE — 1126F PR PAIN SEVERITY QUANTIFIED, NO PAIN PRESENT: ICD-10-PCS | Mod: CPTII,S$GLB,, | Performed by: INTERNAL MEDICINE

## 2022-10-14 PROCEDURE — 1126F AMNT PAIN NOTED NONE PRSNT: CPT | Mod: CPTII,S$GLB,, | Performed by: INTERNAL MEDICINE

## 2022-10-14 PROCEDURE — 1159F PR MEDICATION LIST DOCUMENTED IN MEDICAL RECORD: ICD-10-PCS | Mod: CPTII,S$GLB,, | Performed by: INTERNAL MEDICINE

## 2022-10-14 PROCEDURE — 99999 PR PBB SHADOW E&M-EST. PATIENT-LVL IV: ICD-10-PCS | Mod: PBBFAC,,, | Performed by: INTERNAL MEDICINE

## 2022-10-14 PROCEDURE — 90471 IMMUNIZATION ADMIN: CPT | Mod: S$GLB,,, | Performed by: INTERNAL MEDICINE

## 2022-10-14 PROCEDURE — 1101F PT FALLS ASSESS-DOCD LE1/YR: CPT | Mod: CPTII,S$GLB,, | Performed by: INTERNAL MEDICINE

## 2022-10-14 PROCEDURE — 3288F PR FALLS RISK ASSESSMENT DOCUMENTED: ICD-10-PCS | Mod: CPTII,S$GLB,, | Performed by: INTERNAL MEDICINE

## 2022-10-14 PROCEDURE — 90694 FLU VACCINE - QUADRIVALENT - ADJUVANTED: ICD-10-PCS | Mod: S$GLB,,, | Performed by: INTERNAL MEDICINE

## 2022-10-14 PROCEDURE — 90694 VACC AIIV4 NO PRSRV 0.5ML IM: CPT | Mod: S$GLB,,, | Performed by: INTERNAL MEDICINE

## 2022-10-14 PROCEDURE — 90677 PNEUMOCOCCAL CONJUGATE VACCINE 20-VALENT: ICD-10-PCS | Mod: S$GLB,,, | Performed by: INTERNAL MEDICINE

## 2022-10-14 PROCEDURE — 99215 OFFICE O/P EST HI 40 MIN: CPT | Mod: 25,S$GLB,, | Performed by: INTERNAL MEDICINE

## 2022-10-14 PROCEDURE — 1101F PR PT FALLS ASSESS DOC 0-1 FALLS W/OUT INJ PAST YR: ICD-10-PCS | Mod: CPTII,S$GLB,, | Performed by: INTERNAL MEDICINE

## 2022-10-14 PROCEDURE — 99999 PR PBB SHADOW E&M-EST. PATIENT-LVL IV: CPT | Mod: PBBFAC,,, | Performed by: INTERNAL MEDICINE

## 2022-10-14 PROCEDURE — 90471 FLU VACCINE - QUADRIVALENT - ADJUVANTED: ICD-10-PCS | Mod: S$GLB,,, | Performed by: INTERNAL MEDICINE

## 2022-10-14 RX ORDER — UBIDECARENONE 75 MG
500 CAPSULE ORAL DAILY
Qty: 90 TABLET | Refills: 1 | Status: SHIPPED | OUTPATIENT
Start: 2022-10-14 | End: 2022-10-28 | Stop reason: SDUPTHER

## 2022-10-14 RX ORDER — ROSUVASTATIN CALCIUM 10 MG/1
10 TABLET, COATED ORAL DAILY
Qty: 30 TABLET | Refills: 5 | Status: SHIPPED | OUTPATIENT
Start: 2022-10-14 | End: 2023-01-30 | Stop reason: SDUPTHER

## 2022-10-14 NOTE — PATIENT INSTRUCTIONS
Please call [employer] to see if can arrange Medical Transporation     Debrox to both ears 2-4 x per day x 5 days - will recheck next f/u w possible earwash if needed    Recommend BiValent Covid - can get at [place] [facility] at [place]

## 2022-10-14 NOTE — PROGRESS NOTES
Rigoberto Perez  10/14/2022  [id number]    Trinidad Bai MD  Patient Care Team:  Trinidad Bai MD as PCP - General (Internal Medicine)  Dwight Rubio MD as Consulting Physician (Otolaryngology)  Joan Cadena NP as Nurse Practitioner (Family Medicine)  Alice Ruiz NP as Nurse Practitioner (Family Medicine)    Visit Type: Establish Care    Chief Complaint:  Chief Complaint   Patient presents with    Follow-up     Patient in for follow up with no complaints     Again, only c/o R leg hurting after pushing Mrs. Perez's heavy wheelchair.    History of Present Illness: Mr. Rigoberto Perez is an 81 year old male Medical issues include nicotine cigarette dependence, COPD, CAD w normal NST 2021, diastolic CHF, h/o skin cancer. Chronic exertional dyspnea. Chronically underweight. He and his wife are now living with her son here in [place].     H/o social alcohol - sometimes heavy - quit 40 years ago. Has been smoking for 70 years - pack a day past many years.    Hosp/ED/Urgent Care:  6/26/22 ED [facility]  6/20/22 ED [facility] syncope    Recent appointments:   8/29/22 [facility] Dr. Bai  3/17/21 Cardiology Dr. Areli Ghosh  2/18/20 Pulmonology Dr. Fuad Ghosh     The following were reviewed: Active problem list, medication list, allergies, family history, social history, and Health Maintenance.     History:  Past Medical History:   Diagnosis Date    Carotid artery disease      8/30/17 shows 50% stenosis    Cataract     COPD (chronic obstructive pulmonary disease)     History of basal cell cancer      Past Surgical History:   Procedure Laterality Date    CATARACT EXTRACTION, BILATERAL       Family History   Problem Relation Age of Onset    Diabetes Maternal Grandmother      Social History     Socioeconomic History    Marital status:    Tobacco Use    Smoking status: Every Day     Packs/day: 1.00     Years: 70.00     Pack years: 70.00     Types: Cigarettes    Smokeless tobacco: Never    Substance and Sexual Activity    Alcohol use: No    Drug use: No    Sexual activity: Never     Patient Active Problem List   Diagnosis    Chronic obstructive pulmonary disease    Nicotine dependence, cigarettes, with other nicotine-induced disorders    Anemia due to stage 3a chronic kidney disease    Encounter for hepatitis C screening test for low risk patient    Hyperlipidemia    Thrombocytopenia    Claudication, intermittent    Onychomycosis    Tinea pedis of both feet    Centrilobular emphysema    Underweight    B12 deficiency    Congestive heart failure    Prediabetes    Thyroid nodule    Vitamin B12 deficiency (dietary) anemia    Bilateral impacted cerumen    Cerebrovascular disease    History of basal cell cancer    Carotid artery disease     Review of patient's allergies indicates:  No Known Allergies    Medications:  Current Outpatient Medications on File Prior to Visit   Medication Sig Dispense Refill    albuterol (PROVENTIL) 2.5 mg /3 mL (0.083 %) nebulizer solution Take 3 mLs (2.5 mg total) by nebulization every 4 (four) hours as needed for Wheezing or Shortness of Breath. 1 each 1    aspirin (ECOTRIN) 81 MG EC tablet Take 1 tablet (81 mg total) by mouth once daily. 30 tablet 5    TRELEGY ELLIPTA 100-62.5-25 mcg DsDv Take 1 puff by mouth once daily. 60 each 5    clotrimazole (LOTRIMIN) 1 % cream Apply topically 2 (two) times daily. 24 g 1    nebulizer and compressor Ivon Use as directed       No current facility-administered medications on file prior to visit.     Medications have been reviewed and reconciled with patient at visit today.    Barriers to medications present (no )    Adverse reactions to current medications (no)    Review of Systems   Constitutional:  Positive for fatigue. Negative for appetite change and unexpected weight change.   HENT:  Positive for hearing loss. Negative for nasal congestion, dental problem, ear pain and sinus pressure/congestion.    Eyes:  Positive for visual  "disturbance. Negative for photophobia and pain.        Eye sight "bad"   Respiratory:  Positive for shortness of breath and wheezing. Negative for chest tightness.    Cardiovascular:  Negative for chest pain, palpitations, leg swelling and claudication.        Denies claudication today - no pain in L leg or calf - R upper leg hurts w exertion   Gastrointestinal:  Negative for abdominal pain, constipation, diarrhea, nausea and vomiting.   Genitourinary:  Negative for bladder incontinence, difficulty urinating, dysuria, frequency, hematuria and urgency.   Musculoskeletal:  Positive for leg pain. Negative for arthralgias.        C/o R leg pain w pushing Mrs. Perez in wheelchair but walks a lot - denies pain w walking   Integumentary:  Negative for rash and wound.        Skin changes   Neurological:  Positive for memory loss. Negative for dizziness, vertigo, tremors, weakness, light-headedness and numbness.   Hematological:  Bruises/bleeds easily.   Psychiatric/Behavioral:  Negative for dysphoric mood and sleep disturbance. The patient is not nervous/anxious.       Exam:  Vitals:    10/14/22 1421   BP: 131/62   Pulse: (!) 43   Temp: 97.5 °F (36.4 °C)     Though pt reports used to weight 140 lbs and loss of 30 lbs over past 2 years, review of past weights note wt 10/12/17 of 110 lbs, with highest wt since then, 2/08/18 of 115 lbs, lowest 2/20/19 or 104.    Weight: 48.8 kg (107 lb 9.6 oz)   Body mass index is 15.01 kg/m².     BP Readings from Last 3 Encounters:   10/14/22 131/62   08/29/22 (!) 177/77   08/29/22 122/74      Physical Exam  Vitals reviewed.   Constitutional:       General: He is not in acute distress.     Appearance: He is ill-appearing.      Comments: underweight   HENT:      Head: Normocephalic and atraumatic.      Right Ear: External ear normal. There is impacted cerumen.      Left Ear: External ear normal. There is impacted cerumen.      Nose: Nose normal. No congestion or rhinorrhea.      " Mouth/Throat:      Mouth: Mucous membranes are moist.      Pharynx: No oropharyngeal exudate or posterior oropharyngeal erythema.   Eyes:      General: No scleral icterus.        Right eye: No discharge.         Left eye: No discharge.      Extraocular Movements: Extraocular movements intact.   Neck:      Vascular: No carotid bruit.   Cardiovascular:      Rate and Rhythm: Regular rhythm. Bradycardia present.      Heart sounds: No murmur heard.     Comments: Distant heart sounds  Pulmonary:      Effort: Pulmonary effort is normal.      Breath sounds: Normal breath sounds. No wheezing, rhonchi or rales.   Abdominal:      General: Abdomen is flat. Bowel sounds are normal.      Tenderness: There is no abdominal tenderness.   Musculoskeletal:         General: Deformity present.      Right lower leg: No edema.      Left lower leg: No edema.      Comments: Arthritic changes    Lymphadenopathy:      Cervical: No cervical adenopathy.   Skin:     General: Skin is warm and dry.      Findings: Lesion present.      Comments: Scattered lesions, discolorations   Neurological:      General: No focal deficit present.      Mental Status: He is alert. Mental status is at baseline.   Psychiatric:         Mood and Affect: Mood normal.         Behavior: Behavior normal.      Laboratory Reviewed: (Yes)  Lab Results   Component Value Date    WBC 7.94 08/29/2022    HGB 11.6 (L) 08/29/2022    HCT 36.4 (L) 08/29/2022     08/29/2022    MCV 95 08/29/2022    CHOL 188 08/29/2022    TRIG 93 08/29/2022    HDL 50 08/29/2022    LDLCALC 119.4 08/29/2022    ALT 14 06/26/2022    AST 22 06/26/2022     08/29/2022    K 4.9 08/29/2022     08/29/2022    CREATININE 1.8 (H) 08/29/2022    BUN 27 (H) 08/29/2022    CO2 26 08/29/2022    MG 2.0 08/29/2022    TSH 1.404 08/29/2022    FREET4 1.05 08/29/2022    HGBA1C 5.7 (H) 08/29/2022     Labs 8/29/22: eGFR 37.1 mma 0.43 (mildly elevated) HCV NR PTHi 42.4   6/26/22:  eGFR 43 ua protein 30  hgb trace     US Thyroid 8/29/22: Small thyroid with mild diffuse parenchymal heterogeneity, possibly related to chronic sequela of thyroiditis. Multiple small benign-appearing thyroid nodules as above. No findings meeting TIRADS criteria for biopsy or follow-up.    HILDA 8/29/22: Rt hilda and waveforms suggestive of mild disease. Left hilda and waveforms suggestive of moderate disease.    ECG 6/26/22: Sinus rhythm with frequent Premature ventricular complexes LVH with repolarization abnormality     CXR 6/26/22: Hyperinflation with small trace right-sided pleural effusion. Findings may relate to emphysema. Minimal reticular opacities. The cardiac silhouette is normal in size. The hilar and mediastinal contours are unremarkable. Bones are intact.    MRI Brain 1/20/18: No acute intracranial abnormality is seen. Moderate cerebral atrophy and nonspecific, chronic, supratentorial white matter disease. This most commonly reflects small vessel disease such as arteriolosclerosis.    Assessment:   82 y.o. male with multiple co-morbid illnesses here for continued follow up of medical problems.      The primary encounter diagnosis was Bilateral impacted cerumen. Diagnoses of Centrilobular emphysema, Prediabetes, Nicotine dependence, cigarettes, with other nicotine-induced disorders, Pure hypercholesterolemia, Congestive heart failure, unspecified HF chronicity, unspecified heart failure type, History of basal cell cancer, Underweight, Anemia due to stage 3a chronic kidney disease, Vitamin B12 deficiency (dietary) anemia, Cerebrovascular disease, and Carotid artery disease, unspecified laterality, unspecified type were also pertinent to this visit.      Plan:     Problem List Items Addressed This Visit          Neuro    Cerebrovascular disease       ENT    Bilateral impacted cerumen - Primary     Debrox reordered - recheck ears next f/u            Pulmonary    Centrilobular emphysema     Stable w trelegy - not interested in cutting  back or quitting smoking - consider CT of chest once can ensure transportation - in agreement w getting fluc vaccine and PCV 20 today - recommend also Bivlaent Covid vaccine            Cardiac/Vascular    Hyperlipidemia     In agreement w adding statin - recheck lipid panel in 6 mos          Congestive heart failure     Reported h/o CHF- elevated BNP noted on prior lab but no recent echocardiogram w/in EHR - no current signs, symptoms of CHF other than SOB/DUE likely due to advanced COPD/emphysema - consider order echocardiogram once can ensure transportation available           Carotid artery disease     Consider repeat carotid US (once able to ensure transportation)            Oncology    Anemia due to stage 3a chronic kidney disease (Chronic)     Mild, normocytic - consider check iron studies, repeat RFP - discuss adding low dose ARB         Vitamin B12 deficiency (dietary) anemia (Chronic)     Adding oral B12 supplement         History of basal cell cancer     Recommend referral to dermatology for evaluation and surveillance once able to arrange transportation            Endocrine    Underweight     Has been sig underweight since at least 2017 - cont monitor - consider further imaging: CT chest/abd/pelvis          Prediabetes     Consider recheck HgbA1c - (late Nov vs late Feb?) would probably not add metformin at this point w CKD stage 3b - encourage healthier food/beverage choices and to stay mobile and active            Other    Nicotine dependence, cigarettes, with other nicotine-induced disorders     Not motivated to quit or cut back at this time - COPD/emphysema - Chronic bronchitis? - recommend CT chest for f/u abnormal CXR and lung cancer screening            Health Maintenance         Date Due Completion Date    TETANUS VACCINE Never done ---    Shingles Vaccine (1 of 2) Never done ---    Pneumococcal Vaccines (Age 65+) (2 - PPSV23 or PCV20) 02/08/2019 2/8/2018    Lipid Panel 01/23/2020 1/23/2019     COVID-19 Vaccine (3 - Booster for Pfizer series) 07/18/2021 2/18/2021    Influenza Vaccine (1) 09/01/2022 9/18/2018            -Some of patient's prior imaging and lab results were reviewed and discussed with patient  -Possible treatment options and alternatives were discussed with the patient. Patient expressed understanding. Patient was given the opportunity to ask questions and be an active participant in their medical care. Patient had no further questions or concerns at this time.   -Documentation of patient's health and condition was also obtained from family member who was present during visit.  -Patient is an overall moderate to HIGH risk for health complications from their medical conditions.     Follow up: Follow up in about 2 weeks (around 10/28/2022) for Follow Up.    After visit summary printed and given to patient upon discharge.  Patient care plan included in After visit summary.    TOTAL TIME evaluating and managing this patient for this encounter was greater than 40 minutes. This time was spent personally by me on the following activities: review of patient's past medical history, assessing age-appropriate health maintenance needs, review of any interval history, review and interpretation of lab results, review and interpretation of imaging test results, review and interpretation of cardiology test results, reviewing consulting specialist notes, obtaining history from the patient and family, examination of the patient, medication reconciliation, managing and/or ordering prescription medications, ordering imaging tests, ordering referral to subspecialty provider(s), educating patient and answering their questions about diagnosis, treatment plan, and goals of treatment, discussing planned follow-up and final documentation of the visit. This time was exclusive of any separately billable procedures for this patient and exclusive of time spent treating any other patients.

## 2022-10-15 PROBLEM — N18.31 ANEMIA DUE TO STAGE 3A CHRONIC KIDNEY DISEASE: Chronic | Status: ACTIVE | Noted: 2022-08-29

## 2022-10-15 PROBLEM — D51.8 VITAMIN B12 DEFICIENCY (DIETARY) ANEMIA: Chronic | Status: ACTIVE | Noted: 2022-08-29

## 2022-10-15 PROBLEM — F17.218 NICOTINE DEPENDENCE, CIGARETTES, WITH OTHER NICOTINE-INDUCED DISORDERS: Status: ACTIVE | Noted: 2019-01-23

## 2022-10-15 PROBLEM — I77.9 CAROTID ARTERY DISEASE: Status: ACTIVE | Noted: 2022-08-29

## 2022-10-15 PROBLEM — D63.1 ANEMIA DUE TO STAGE 3A CHRONIC KIDNEY DISEASE: Chronic | Status: ACTIVE | Noted: 2022-08-29

## 2022-10-15 PROBLEM — I67.9 CEREBROVASCULAR DISEASE: Status: ACTIVE | Noted: 2022-08-29

## 2022-10-15 NOTE — ASSESSMENT & PLAN NOTE
Has been sig underweight since at least 2017 - cont monitor - consider further imaging: CT chest/abd/pelvis

## 2022-10-15 NOTE — ASSESSMENT & PLAN NOTE
Stable w trelegy - not interested in cutting back or quitting smoking - consider CT of chest once can ensure transportation - in agreement w getting fluc vaccine and PCV 20 today - recommend also B[facility]laent Covid vaccine

## 2022-10-15 NOTE — ASSESSMENT & PLAN NOTE
Recommend referral to dermatology for evaluation and surveillance once able to arrange transportation

## 2022-10-15 NOTE — ASSESSMENT & PLAN NOTE
Consider recheck HgbA1c - (late Nov vs late Feb?) would probably not add metformin at this point w CKD stage 3b - encourage healthier food/beverage choices and to stay mobile and active

## 2022-10-15 NOTE — ASSESSMENT & PLAN NOTE
Reported h/o CHF- elevated BNP noted on prior lab but no recent echocardiogram w/in EHR - no current signs, symptoms of CHF other than SOB/DUE likely due to advanced COPD/emphysema - consider order echocardiogram once can ensure transportation available

## 2022-10-28 ENCOUNTER — OFFICE VISIT (OUTPATIENT)
Dept: PRIMARY CARE CLINIC | Facility: CLINIC | Age: 82
End: 2022-10-28
Payer: MEDICARE

## 2022-10-28 VITALS
TEMPERATURE: 97 F | BODY MASS INDEX: 15.31 KG/M2 | DIASTOLIC BLOOD PRESSURE: 77 MMHG | HEIGHT: 71 IN | SYSTOLIC BLOOD PRESSURE: 186 MMHG | OXYGEN SATURATION: 99 % | HEART RATE: 71 BPM | WEIGHT: 109.38 LBS

## 2022-10-28 DIAGNOSIS — I67.9 CEREBRAL VASCULAR DISEASE: ICD-10-CM

## 2022-10-28 DIAGNOSIS — E78.5 HYPERLIPIDEMIA, UNSPECIFIED HYPERLIPIDEMIA TYPE: ICD-10-CM

## 2022-10-28 DIAGNOSIS — E53.8 B12 DEFICIENCY: ICD-10-CM

## 2022-10-28 DIAGNOSIS — Z12.5 PROSTATE CANCER SCREENING: ICD-10-CM

## 2022-10-28 DIAGNOSIS — N18.31 ANEMIA DUE TO STAGE 3A CHRONIC KIDNEY DISEASE: Primary | ICD-10-CM

## 2022-10-28 DIAGNOSIS — D63.1 ANEMIA DUE TO STAGE 3A CHRONIC KIDNEY DISEASE: Primary | ICD-10-CM

## 2022-10-28 DIAGNOSIS — H91.93 BILATERAL HEARING LOSS, UNSPECIFIED HEARING LOSS TYPE: ICD-10-CM

## 2022-10-28 DIAGNOSIS — I15.0 RENOVASCULAR HYPERTENSION: ICD-10-CM

## 2022-10-28 DIAGNOSIS — R73.03 PREDIABETES: ICD-10-CM

## 2022-10-28 DIAGNOSIS — I77.9 CAROTID ARTERY DISEASE, UNSPECIFIED LATERALITY, UNSPECIFIED TYPE: ICD-10-CM

## 2022-10-28 DIAGNOSIS — Z85.828 HISTORY OF BASAL CELL CANCER: ICD-10-CM

## 2022-10-28 DIAGNOSIS — J43.2 CENTRILOBULAR EMPHYSEMA: ICD-10-CM

## 2022-10-28 DIAGNOSIS — I95.1 ORTHOSTASIS: ICD-10-CM

## 2022-10-28 DIAGNOSIS — D68.9 COAGULOPATHY: ICD-10-CM

## 2022-10-28 DIAGNOSIS — D52.0 DIETARY FOLATE DEFICIENCY ANEMIA: ICD-10-CM

## 2022-10-28 DIAGNOSIS — F10.21 ALCOHOL USE DISORDER, MODERATE, IN SUSTAINED REMISSION: ICD-10-CM

## 2022-10-28 DIAGNOSIS — R64 CACHEXIA: ICD-10-CM

## 2022-10-28 DIAGNOSIS — I73.9 PERIPHERAL VASCULAR DISEASE: ICD-10-CM

## 2022-10-28 PROCEDURE — 3288F PR FALLS RISK ASSESSMENT DOCUMENTED: ICD-10-PCS | Mod: CPTII,S$GLB,, | Performed by: INTERNAL MEDICINE

## 2022-10-28 PROCEDURE — 99215 OFFICE O/P EST HI 40 MIN: CPT | Mod: S$GLB,,, | Performed by: INTERNAL MEDICINE

## 2022-10-28 PROCEDURE — 1101F PR PT FALLS ASSESS DOC 0-1 FALLS W/OUT INJ PAST YR: ICD-10-PCS | Mod: CPTII,S$GLB,, | Performed by: INTERNAL MEDICINE

## 2022-10-28 PROCEDURE — 3288F FALL RISK ASSESSMENT DOCD: CPT | Mod: CPTII,S$GLB,, | Performed by: INTERNAL MEDICINE

## 2022-10-28 PROCEDURE — 1159F PR MEDICATION LIST DOCUMENTED IN MEDICAL RECORD: ICD-10-PCS | Mod: CPTII,S$GLB,, | Performed by: INTERNAL MEDICINE

## 2022-10-28 PROCEDURE — 99215 PR OFFICE/OUTPT VISIT, EST, LEVL V, 40-54 MIN: ICD-10-PCS | Mod: S$GLB,,, | Performed by: INTERNAL MEDICINE

## 2022-10-28 PROCEDURE — 99999 PR PBB SHADOW E&M-EST. PATIENT-LVL V: CPT | Mod: PBBFAC,,, | Performed by: INTERNAL MEDICINE

## 2022-10-28 PROCEDURE — 99499 RISK ADDL DX/OHS AUDIT: ICD-10-PCS | Mod: HCNC,S$GLB,, | Performed by: INTERNAL MEDICINE

## 2022-10-28 PROCEDURE — 1126F AMNT PAIN NOTED NONE PRSNT: CPT | Mod: CPTII,S$GLB,, | Performed by: INTERNAL MEDICINE

## 2022-10-28 PROCEDURE — 99999 PR PBB SHADOW E&M-EST. PATIENT-LVL V: ICD-10-PCS | Mod: PBBFAC,,, | Performed by: INTERNAL MEDICINE

## 2022-10-28 PROCEDURE — 1101F PT FALLS ASSESS-DOCD LE1/YR: CPT | Mod: CPTII,S$GLB,, | Performed by: INTERNAL MEDICINE

## 2022-10-28 PROCEDURE — 3077F PR MOST RECENT SYSTOLIC BLOOD PRESSURE >= 140 MM HG: ICD-10-PCS | Mod: CPTII,S$GLB,, | Performed by: INTERNAL MEDICINE

## 2022-10-28 PROCEDURE — 1160F RVW MEDS BY RX/DR IN RCRD: CPT | Mod: CPTII,S$GLB,, | Performed by: INTERNAL MEDICINE

## 2022-10-28 PROCEDURE — 3077F SYST BP >= 140 MM HG: CPT | Mod: CPTII,S$GLB,, | Performed by: INTERNAL MEDICINE

## 2022-10-28 PROCEDURE — 1126F PR PAIN SEVERITY QUANTIFIED, NO PAIN PRESENT: ICD-10-PCS | Mod: CPTII,S$GLB,, | Performed by: INTERNAL MEDICINE

## 2022-10-28 PROCEDURE — 3078F DIAST BP <80 MM HG: CPT | Mod: CPTII,S$GLB,, | Performed by: INTERNAL MEDICINE

## 2022-10-28 PROCEDURE — 1160F PR REVIEW ALL MEDS BY PRESCRIBER/CLIN PHARMACIST DOCUMENTED: ICD-10-PCS | Mod: CPTII,S$GLB,, | Performed by: INTERNAL MEDICINE

## 2022-10-28 PROCEDURE — 1159F MED LIST DOCD IN RCRD: CPT | Mod: CPTII,S$GLB,, | Performed by: INTERNAL MEDICINE

## 2022-10-28 PROCEDURE — 3078F PR MOST RECENT DIASTOLIC BLOOD PRESSURE < 80 MM HG: ICD-10-PCS | Mod: CPTII,S$GLB,, | Performed by: INTERNAL MEDICINE

## 2022-10-28 PROCEDURE — 99499 UNLISTED E&M SERVICE: CPT | Mod: HCNC,S$GLB,, | Performed by: INTERNAL MEDICINE

## 2022-10-28 RX ORDER — UBIDECARENONE 75 MG
500 CAPSULE ORAL DAILY
Qty: 90 TABLET | Refills: 1 | Status: SHIPPED | OUTPATIENT
Start: 2022-10-28 | End: 2022-12-06 | Stop reason: SDUPTHER

## 2022-10-28 NOTE — PROGRESS NOTES
Rigoberto Perez  10/28/2022  [id number]    Trinidad Bai MD  Patient Care Team:  Trinidad Bai MD as PCP - General (Internal Medicine)  Dwight Rubio MD as Consulting Physician (Otolaryngology)  Joan Cadena NP as Nurse Practitioner (Family Medicine)  Alice Ruiz NP as Nurse Practitioner (Family Medicine)    Visit Type:a scheduled routine follow-up visit    Chief Complaint:  Chief Complaint   Patient presents with    Follow-up     Patient in for two week follow up with no complaints     History of Present Illness: Mr. Rigoberto Perez is an 81 year old male Medical issues include nicotine cigarette dependence, COPD, HTN, CAD w normal NST 2021, diastolic CHF, h/o skin cancer. H/o syncopal episodes - most recent in June. Chronic exertional dyspnea. Chronically underweight. He and his wife are now living with her son here in [place].      H/o social alcohol - sometimes heavy - quit 40 years ago. Has been smoking for 70 years - pack a day most day past many years though recently cut back to just 2 cigarettes per day.     Hosp/ED/Urgent Care:  6/26/22 ED [facility]  6/20/22 ED [facility] syncope     Recent appointments:   10/14/22 [facility] [address]. Bai  8/29/22 [facility] Dr. Bai  3/17/21 Cardiology Dr. Areli Ghosh  2/18/20 Pulmonology Dr. Fuad Ghosh    Upcoming appointments:  Future Appointments       Date Provider Specialty Appt Notes    11/28/2022 Alice Ruiz NP Primary Care one month follow up            The following were reviewed: Active problem list, medication list, allergies, family history, social history, and Health Maintenance.     History:  Past Medical History:   Diagnosis Date    Carotid artery disease     us 8/30/17 shows 50% stenosis    Cataract     COPD (chronic obstructive pulmonary disease)     History of basal cell cancer      Patient Active Problem List   Diagnosis    Chronic obstructive pulmonary disease    Nicotine dependence, cigarettes, with other  nicotine-induced disorders    Anemia due to stage 3a chronic kidney disease    Encounter for hepatitis C screening test for low risk patient    Hyperlipidemia    Thrombocytopenia    Peripheral vascular disease    Onychomycosis    Tinea pedis of both feet    Centrilobular emphysema    Underweight    B12 deficiency    Congestive heart failure    Prediabetes    Thyroid nodule    Vitamin B12 deficiency (dietary) anemia    Bilateral impacted cerumen    Cerebral vascular disease    History of basal cell cancer    Carotid artery disease    Cachexia    Orthostasis    Renovascular hypertension    Bilateral hearing loss    Alcohol use disorder, moderate, in sustained remission     Review of patient's allergies indicates:  No Known Allergies    Medications:  Current Outpatient Medications on File Prior to Visit   Medication Sig Dispense Refill    albuterol (PROVENTIL) 2.5 mg /3 mL (0.083 %) nebulizer solution Take 3 mLs (2.5 mg total) by nebulization every 4 (four) hours as needed for Wheezing or Shortness of Breath. 1 each 1    aspirin (ECOTRIN) 81 MG EC tablet Take 1 tablet (81 mg total) by mouth once daily. 30 tablet 5    rosuvastatin (CRESTOR) 10 MG tablet Take 1 tablet (10 mg total) by mouth once daily. 30 tablet 5    TRELEGY ELLIPTA 100-62.5-25 mcg DsDv Take 1 puff by mouth once daily. 60 each 5    clotrimazole (LOTRIMIN) 1 % cream Apply topically 2 (two) times daily. 24 g 1    nebulizer and compressor Ivon Use as directed       No current facility-administered medications on file prior to visit.     Medications have been reviewed and reconciled with patient at visit today.    Barriers to medications present (yes)  Says was unable to get earwax remover, vitamin B12    Adverse reactions to current medications (no)    Over the counter medications reviewed (Yes) and if needed added to active Medication list.    Review of Systems   Constitutional:  Negative for activity change, appetite change, fatigue and fever.         Gained 3 lbs!   HENT:  Positive for dental problem and hearing loss. Negative for ear pain, sinus pressure/congestion and trouble swallowing.    Eyes:  Positive for visual disturbance.        Poor vision on R  H/o bleed on L but vision improved after cataract surgery   Respiratory:  Positive for cough. Negative for chest tightness and shortness of breath.    Cardiovascular:  Negative for chest pain, palpitations, leg swelling and claudication.   Gastrointestinal:  Negative for abdominal distention, abdominal pain, change in bowel habit, nausea, vomiting and change in bowel habit.   Endocrine: Negative for cold intolerance, polydipsia and polyuria.   Genitourinary:  Negative for difficulty urinating, frequency and urgency.   Musculoskeletal:  Positive for arthralgias.   Integumentary:  Negative for rash.        toes better w use of anti-fungal cream      Exam:  Initial VSS: /66 P 70 O2 99% T 97.4  Vitals:    10/28/22 1606   BP: (!) 186/77   Pulse: 71   Temp:      Weight: 49.6 kg (109 lb 6.4 oz)   Body mass index is 15.26 kg/m².    Orthostatics BP Pulse  Lying         186/77 71   Sitting         180/77 71  Standing      149/71 75    Physical Exam  Vitals reviewed.   Constitutional:       General: He is not in acute distress.     Appearance: He is ill-appearing.      Comments: underweight   HENT:      Head: Normocephalic and atraumatic.      Right Ear: External ear normal.      Left Ear: External ear normal.      Ears:      Comments: S/p earwash to remove B impacted cerumen - ear canals clear w small amount of blood along side of R ear canal     Nose: Nose normal. No congestion or rhinorrhea.      Mouth/Throat:      Mouth: Mucous membranes are moist.      Pharynx: Oropharynx is clear. No oropharyngeal exudate or posterior oropharyngeal erythema.      Comments: Poor dentition w caries and missing teeth  Eyes:      General: No scleral icterus.        Right eye: No discharge.         Left eye: No discharge.       Extraocular Movements: Extraocular movements intact.      Conjunctiva/sclera: Conjunctivae normal.   Neck:      Vascular: No carotid bruit.      Comments: Thyroid small, firm, nodular, mobile, non-tender  Cardiovascular:      Rate and Rhythm: Normal rate and regular rhythm.      Heart sounds: No murmur heard.     Comments: distant  Pulmonary:      Effort: Pulmonary effort is normal.      Breath sounds: Normal breath sounds.   Abdominal:      General: Abdomen is flat. Bowel sounds are normal. There is no distension.      Tenderness: There is no abdominal tenderness. There is no guarding.   Musculoskeletal:      Right lower leg: No edema.      Left lower leg: No edema.   Lymphadenopathy:      Cervical: No cervical adenopathy.   Skin:     General: Skin is warm and dry.      Findings: Bruising and lesion present.      Comments: Pt and wife report purplish lesion on left cheek long-standing w no sig changes - does have hypopigmented areaL temple s/p removal of prior skin cancer - flat, dry, brown patch noted over R temple - telangiectasias noted on nose - extensive skin changes B forearms - fingernails long and curling over tops of fingers   Neurological:      General: No focal deficit present.      Mental Status: He is alert. Mental status is at baseline.      Coordination: Coordination normal.      Comments: More talkative and interactive today   Psychiatric:         Mood and Affect: Mood normal.         Behavior: Behavior normal.      Laboratory Reviewed: (Yes)  Lab Results   Component Value Date    WBC 7.94 08/29/2022    HGB 11.6 (L) 08/29/2022    HCT 36.4 (L) 08/29/2022     08/29/2022    MCV 95 08/29/2022    CHOL 188 08/29/2022    TRIG 93 08/29/2022    HDL 50 08/29/2022    LDLCALC 119.4 08/29/2022    ALT 14 06/26/2022    AST 22 06/26/2022     08/29/2022    K 4.9 08/29/2022     08/29/2022    CREATININE 1.8 (H) 08/29/2022    BUN 27 (H) 08/29/2022    CO2 26 08/29/2022    MG 2.0 08/29/2022    TSH  1.404 08/29/2022    FREET4 1.05 08/29/2022    HGBA1C 5.7 (H) 08/29/2022     Labs 8/29/22: eGFR 37.1 mma 0.43 (mildly elevated) HCV NR PTHi 42.4   6/26/22:  eGFR 43 ua protein 30 hgb trace      US Thyroid 8/29/22: Small thyroid with mild diffuse parenchymal heterogeneity, possibly related to chronic sequela of thyroiditis. Multiple small benign-appearing thyroid nodules as above. No findings meeting TIRADS criteria for biopsy or follow-up.     HILDA 8/29/22: Rt hilda and waveforms suggestive of mild disease. Left hilda and waveforms suggestive of moderate disease.     ECG 6/26/22: Sinus rhythm with frequent Premature ventricular complexes LVH with repolarization abnormality      CXR 6/26/22: Hyperinflation with small trace right-sided pleural effusion. Findings may relate to emphysema. Minimal reticular opacities. The cardiac silhouette is normal in size. The hilar and mediastinal contours are unremarkable. Bones are intact.     MRI Brain 1/20/18: No acute intracranial abnormality is seen. Moderate cerebral atrophy and nonspecific, chronic, supratentorial white matter disease. This most commonly reflects small vessel disease such as arteriolosclerosis.    Assessment:   82 y.o. male with multiple co-morbid illnesses here for continued follow up of medical problems.      The primary encounter diagnosis was Anemia due to stage 3a chronic kidney disease. Diagnoses of Coagulopathy, Cachexia, Cerebral vascular disease, Dietary folate deficiency anemia, Prostate cancer screening, Centrilobular emphysema, Prediabetes, B12 deficiency, History of basal cell cancer, Orthostasis, Renovascular hypertension, Hyperlipidemia, unspecified hyperlipidemia type, Peripheral vascular disease, Bilateral hearing loss, unspecified hearing loss type, Carotid artery disease, unspecified laterality, unspecified type, and Alcohol use disorder, moderate, in sustained remission were also pertinent to this visit.      Plan:     Problem List Items  Addressed This Visit          Neuro    Cerebral vascular disease     Add statin - encourage smoking cessation - adding anti-hypertensive medication         Relevant Orders    RENAL FUNCTION PANEL       Psychiatric    Alcohol use disorder, moderate, in sustained remission     plts level borderline - check coags - consider abd imaging?            ENT    Bilateral hearing loss     Consider referral to audiology - pt says not bothersome for him but suspect does affect his understanding and interactions - hopefully some improvement now w resolution of B cerumen impaction!            Pulmonary    Centrilobular emphysema     Stable - no recent exacerbation - has cut back significantly on smoking - encourage smoking cessation - recommend CT of chest once can ensure transportation -  recommend Bivlaent Covid vaccine            Cardiac/Vascular    Hyperlipidemia     Tolerating addition of low dose statin - recheck lipid panel in March         Peripheral vascular disease     HILDA 8/29/22: Rt hilda and waveforms suggestive of mild disease. Left hilda and waveforms suggestive of moderate disease.    Tolerating addition of low dose statin - recheck lipid panel in March         Carotid artery disease     us 8/30/17 shows 50% stenosis  Recommend repeat carotid US (once able to ensure transportation)  Cont statin, asa - recommend smoking cessation - adding amlodipine for BP         Orthostasis     Prior syncopal episodes - while standing SBP still elevated when measured today, note 30 point drop from when sitting - we discussed plan to add an anti-hypertensive medication - pt in agreement - however will want to be able to monitor home Bps   (can we get them a home BP monitor?) - advised to increase oral hydration         Renovascular hypertension     Possibly primary HTN but renovascular HTN suspected given sig vascular issues and CKD stage 3b - h/o of syncopal episodes so advise caution w BP lowering - HR has been borderline bradycardic  "at times (and w advanced COPD) so would avoid beta-blocker and until can obtain current creatinine hold on adding ARB - add amlodipine 5 mg to start but will want to ensure able to monitor BP at home            Oncology    Anemia due to stage 3a chronic kidney disease - Primary (Chronic)     Mild, normocytic - consider check iron studies, repeat RFP - hold on  adding ARB until able to obtain a new/current "baseline" creatinine level.         Relevant Orders    RENAL FUNCTION PANEL    Iron and TIBC    Ferritin    History of basal cell cancer     Recommend referral to dermatology for evaluation and surveillance once able to arrange transportation            Endocrine    B12 deficiency     mma slightly elevated -recommend add B12 supplement         Prediabetes     Discussed w patient - consider recheck HgbA1c - (late Nov vs late Feb?) would probably not add metformin at this point w CKD stage 3b - encourage healthier food/beverage choices and to stay mobile and active            Other    Cachexia     Has been underweight but weight essentially stable for a number of years (h/o thyroiditis?) - albumin and electrolytes wnl - note mild normocytic anemia - mma slightly elevated - recommend add daily B12 supplement - check folate (limited intake fruit and veggies), iron studies? - suspect cachexia secondary to advanced chronic vascular and pulmonary disease but also overdue for routine preventative cancer screenings         Relevant Orders    RENAL FUNCTION PANEL    Ambulatory referral/consult to Outpatient Case Management     Other Visit Diagnoses       Coagulopathy        easy bruising - check coags to evaluate for hepatic dysfunction    Relevant Orders    Protime-INR    Dietary folate deficiency anemia        Relevant Orders    Folate    Prostate cancer screening        Denies any LUTS - discussed possible benefits/limitations of PSA testing and recommendations if PSA elevated - pt in agreement to check PSA    Relevant " Orders    PSA, Screening            Health Maintenance         Date Due Completion Date    TETANUS VACCINE Never done ---    Shingles Vaccine (1 of 2) Never done ---    COVID-19 Vaccine (3 - Booster for Pfizer series) 04/15/2021 2/18/2021    Lipid Panel 08/29/2023 8/29/2022            -Patient's lab results were reviewed and discussed with patient  -Treatment options and alternatives were discussed with the patient. Patient expressed understanding. Patient was given the opportunity to ask questions and be an active participant in their medical care. Patient had no further questions or concerns at this time.   -Documentation of patient's health and condition was obtained also from family member who was present during visit.  -Patient is an overall moderate to HIGH risk for health complications from their medical conditions.     Follow up: Follow up in about 1 month (around 11/28/2022) for Follow Up w Alice Ruiz.    Care Plan/Goals: Reviewed Yes   Goals         Gain weight (pt-stated)       Achievable - within patient's control: yes  Maybe    Difficulties Identified: chronic medical conditions    Plan for Overcoming difficulties: nutritional supplements    Timeframe for completion: 6 mos    Update 10/28/22: 3 lbs weight gain - has cut back smoking                      After visit summary printed and given to patient upon discharge.  Patient goals and care plan are included in After visit summary.    TOTAL TIME evaluating and managing this patient for this encounter was greater than 60 minutes. This time was spent personally by me on the following activities: review of patient's past medical history, assessing age-appropriate health maintenance needs, review of any interval history, review and interpretation of lab results, review and interpretation of imaging test results, review and interpretation of cardiology test results, reviewing consulting specialist notes, obtaining history from the patient and family,  examination of the patient, medication reconciliation, managing and/or ordering prescription medications, ordering imaging tests, ordering referral to subspecialty provider(s), educating patient and answering their questions about diagnosis, treatment plan, and goals of treatment, discussing planned follow-up and final documentation of the visit. This time was exclusive of any separately billable procedures for this patient and exclusive of time spent treating any other patients.     Addendum  Unable to draw labs here today (attempted x 3). Reschedule for another day at [facility] or at next f/u appt in November.

## 2022-10-28 NOTE — Clinical Note
Added amlodipine - can we get them a home BP monitor? - when/where to try again to get labwork? Can we get them copy of the [employer] OTC catalog?

## 2022-10-28 NOTE — PATIENT INSTRUCTIONS
If insurance not covering B12 prescription can get OTC    Limit simple carbs, sweets, sugary drinks    Recommend stop smoking - congratulations on cutting back!!

## 2022-10-30 PROBLEM — I15.0 RENOVASCULAR HYPERTENSION: Status: ACTIVE | Noted: 2022-10-28

## 2022-10-30 PROBLEM — F10.21 ALCOHOL USE DISORDER, MODERATE, IN SUSTAINED REMISSION: Status: ACTIVE | Noted: 2022-10-28

## 2022-10-30 PROBLEM — H91.93 BILATERAL HEARING LOSS: Status: ACTIVE | Noted: 2022-10-28

## 2022-10-30 PROBLEM — I95.1 ORTHOSTASIS: Status: ACTIVE | Noted: 2022-10-28

## 2022-10-30 PROBLEM — R64 CACHEXIA: Status: ACTIVE | Noted: 2022-10-30

## 2022-10-30 RX ORDER — AMLODIPINE BESYLATE 5 MG/1
5 TABLET ORAL DAILY
Qty: 30 TABLET | Refills: 11 | Status: SHIPPED | OUTPATIENT
Start: 2022-10-30 | End: 2022-11-04 | Stop reason: SDUPTHER

## 2022-10-30 RX ORDER — ACETAMINOPHEN 500 MG
1 TABLET ORAL DAILY
Qty: 1 EACH | Refills: 0 | Status: SHIPPED | OUTPATIENT
Start: 2022-10-31 | End: 2023-10-31

## 2022-10-30 NOTE — ASSESSMENT & PLAN NOTE
HILDA 8/29/22: Rt hilda and waveforms suggestive of mild disease. Left hilda and waveforms suggestive of moderate disease.    Tolerating addition of low dose statin - recheck lipid panel in March

## 2022-10-30 NOTE — ASSESSMENT & PLAN NOTE
"Mild, normocytic - consider check iron studies, repeat RFP - hold on  adding ARB until able to obtain a new/current "baseline" creatinine level.  "

## 2022-10-30 NOTE — ASSESSMENT & PLAN NOTE
Consider referral to audiology - pt says not bothersome for him but suspect does affect his understanding and interactions - hopefully some improvement now w resolution of B cermessin impaction!

## 2022-10-30 NOTE — ASSESSMENT & PLAN NOTE
Discussed w patient - consider recheck HgbA1c - (late Nov vs late Feb?) would probably not add metformin at this point w CKD stage 3b - encourage healthier food/beverage choices and to stay mobile and active

## 2022-10-30 NOTE — ASSESSMENT & PLAN NOTE
Has been underweight but weight essentially stable for a number of years (h/o thyroiditis?) - albumin and electrolytes wnl - note mild normocytic anemia - mma slightly elevated - recommend add daily B12 supplement - check folate (limited intake fruit and veggies), iron studies? - suspect cachexia secondary to advanced chronic vascular and pulmonary disease but also overdue for routine preventative cancer screenings

## 2022-10-30 NOTE — ASSESSMENT & PLAN NOTE
Prior syncopal episodes - while standing SBP still elevated when measured today, note 30 point drop from when sitting - we discussed plan to add an anti-hypertensive medication - pt in agreement - however will want to be able to monitor home Bps   (can we get them a home BP monitor?) - advised to increase oral hydration

## 2022-10-30 NOTE — ASSESSMENT & PLAN NOTE
Possibly primary HTN but renovascular HTN suspected given sig vascular issues and CKD stage 3b - h/o of syncopal episodes so advise caution w BP lowering - HR has been borderline bradycardic at times (and w advanced COPD) so would avoid beta-blocker and until can obtain current creatinine hold on adding ARB - add amlodipine 5 mg to start but will want to ensure able to monitor BP at home

## 2022-10-30 NOTE — ASSESSMENT & PLAN NOTE
us 8/30/17 shows 50% stenosis  Recommend repeat carotid US (once able to ensure transportation)  Cont statin, asa - recommend smoking cessation - adding amlodipine for BP

## 2022-10-30 NOTE — ASSESSMENT & PLAN NOTE
Stable - no recent exacerbation - has cut back significantly on smoking - encourage smoking cessation - recommend CT of chest once can ensure transportation -  recommend Bivla[employer] Covid vaccine

## 2022-11-01 ENCOUNTER — TELEPHONE (OUTPATIENT)
Dept: PRIMARY CARE CLINIC | Facility: CLINIC | Age: 82
End: 2022-11-01
Payer: MEDICARE

## 2022-11-01 NOTE — TELEPHONE ENCOUNTER
----- Message from Eduardo Sorenson sent at 11/1/2022  3:39 PM CDT -----  Regarding: Change in pharmacy  Contact: [phone]  [facility] [place] [facility] [place]    [facility]

## 2022-11-01 NOTE — TELEPHONE ENCOUNTER
Tried reaching patient to see what prescription he was referencing to that was sent to the wrong pharmacy. Phone just ring, unable to leave a message for patient.

## 2022-11-04 RX ORDER — AMLODIPINE BESYLATE 5 MG/1
5 TABLET ORAL DAILY
Qty: 30 TABLET | Refills: 11 | Status: SHIPPED | OUTPATIENT
Start: 2022-11-04 | End: 2023-07-31

## 2022-11-04 NOTE — TELEPHONE ENCOUNTER
----- Message from Eduardo Sorenson sent at 11/4/2022 12:49 PM CDT -----  Contact: [phone]  Speak to nurse/NP about medication: BP Medication  PT on bp medication  [facility]     [facility] [place] [facility] [place].

## 2022-11-07 ENCOUNTER — TELEPHONE (OUTPATIENT)
Dept: PRIMARY CARE CLINIC | Facility: CLINIC | Age: 82
End: 2022-11-07
Payer: MEDICARE

## 2022-11-07 NOTE — TELEPHONE ENCOUNTER
Patient wife called in stating that he has been waiting om his blood pressure medication that has not been sent to the pharmacy. Patient wife was informed that after checking, I did see that on 11/4/22 the Amlodipine was sent to [facility] on [facility]. Prescription is good for one year. Patient wife verbally understood the information given and stated that she would check with the pharmacy.

## 2022-11-09 ENCOUNTER — TELEPHONE (OUTPATIENT)
Dept: PRIMARY CARE CLINIC | Facility: CLINIC | Age: 82
End: 2022-11-09
Payer: MEDICARE

## 2022-11-10 ENCOUNTER — TELEPHONE (OUTPATIENT)
Dept: PRIMARY CARE CLINIC | Facility: CLINIC | Age: 82
End: 2022-11-10
Payer: MEDICARE

## 2022-11-10 NOTE — TELEPHONE ENCOUNTER
----- Message from Trinidad Bai MD sent at 10/30/2022  3:54 PM CDT -----  Added amlodipine - can we get them a home BP monitor? - when/where to try again to get labwork? Can we get them copy of the [employer] OTC catalog?

## 2022-11-14 ENCOUNTER — TELEPHONE (OUTPATIENT)
Dept: PRIMARY CARE CLINIC | Facility: CLINIC | Age: 82
End: 2022-11-14
Payer: MEDICARE

## 2022-11-17 ENCOUNTER — TELEPHONE (OUTPATIENT)
Dept: PRIMARY CARE CLINIC | Facility: CLINIC | Age: 82
End: 2022-11-17
Payer: MEDICARE

## 2022-11-17 NOTE — TELEPHONE ENCOUNTER
Unable to reach Patient - f/u appt on 11/28/22 - annamaria give otc [employer] handout at visit. Will continue to contact

## 2022-11-28 ENCOUNTER — TELEPHONE (OUTPATIENT)
Dept: PRIMARY CARE CLINIC | Facility: CLINIC | Age: 82
End: 2022-11-28
Payer: MEDICARE

## 2022-11-28 NOTE — TELEPHONE ENCOUNTER
Tried reaching patient in regards to his 1:20 pm appointment on today (seeing if he needs to r/s). Patient phone just ring. Unable to leave a message for him.

## 2022-11-29 NOTE — PROGRESS NOTES
Rigoberto Perez  11/30/2022  [id number]    Trinidad Bai MD  Patient Care Team:  Trinidad Bai MD as PCP - General (Internal Medicine)  Dwight Rubio MD as Consulting Physician (Otolaryngology)  Joan Cadena, NP as Nurse Practitioner (Family Medicine)  Alice Ruiz NP as Nurse Practitioner (Family Medicine)      [facility] Note      Chief Complaint:  Chief Complaint   Patient presents with    1 mth f/u        History of Present Illness:  HPI    One month follow up multiple health problems, HTN,  HF. Elevated BP last visit, amlodipine added.   Did not start  rx until about 2 weeks ago.    BP Readings from Last 3 Encounters:   11/30/22 136/63   10/28/22 (!) 186/77   10/14/22 131/62     Feels good. No concerns today.  Does not have BP cuff.         Review of Systems   Constitutional:  Negative for chills, fever and weight loss.   Respiratory:  Negative for cough, shortness of breath and wheezing.    Cardiovascular:  Negative for chest pain and palpitations.   Gastrointestinal:  Negative for abdominal pain, constipation, diarrhea, heartburn, nausea and vomiting.   Genitourinary:  Negative for dysuria and hematuria.        Nocturia 2-3 times each night   Musculoskeletal:  Negative for falls.   Neurological:  Negative for dizziness and headaches.   Psychiatric/Behavioral:  Negative for memory loss. The patient does not have insomnia.        The following were reviewed: Active problem list, medication list, allergies, family history, social history, and Health Maintenance.     History:  Past Medical History:   Diagnosis Date    Carotid artery disease      8/30/17 shows 50% stenosis    Cataract     COPD (chronic obstructive pulmonary disease)     History of basal cell cancer      Past Surgical History:   Procedure Laterality Date    CATARACT EXTRACTION, BILATERAL       Family History   Problem Relation Age of Onset    Diabetes Maternal Grandmother      Social History      Socioeconomic History    Marital status:    Tobacco Use    Smoking status: Every Day     Packs/day: 1.00     Years: 70.00     Pack years: 70.00     Types: Cigarettes    Smokeless tobacco: Never   Substance and Sexual Activity    Alcohol use: No    Drug use: No    Sexual activity: Never     Patient Active Problem List   Diagnosis    Chronic obstructive pulmonary disease    Nicotine dependence, cigarettes, with other nicotine-induced disorders    Anemia due to stage 3a chronic kidney disease    Encounter for hepatitis C screening test for low risk patient    Hyperlipidemia    Thrombocytopenia    Peripheral vascular disease    Onychomycosis    Tinea pedis of both feet    Centrilobular emphysema    Underweight    B12 deficiency    Congestive heart failure    Prediabetes    Thyroid nodule    Vitamin B12 deficiency (dietary) anemia    Bilateral impacted cerumen    Cerebral vascular disease    History of basal cell cancer    Carotid artery disease    Cachexia    Orthostasis    Renovascular hypertension    Bilateral hearing loss    Alcohol use disorder, moderate, in sustained remission    Stage 3 chronic kidney disease     Review of patient's allergies indicates:  No Known Allergies    Medications:  Current Outpatient Medications on File Prior to Visit   Medication Sig Dispense Refill    albuterol (PROVENTIL) 2.5 mg /3 mL (0.083 %) nebulizer solution Take 3 mLs (2.5 mg total) by nebulization every 4 (four) hours as needed for Wheezing or Shortness of Breath. 1 each 1    amLODIPine (NORVASC) 5 MG tablet Take 1 tablet (5 mg total) by mouth once daily. 30 tablet 11    aspirin (ECOTRIN) 81 MG EC tablet Take 1 tablet (81 mg total) by mouth once daily. 30 tablet 5    cyanocobalamin 500 MCG tablet Take 1 tablet (500 mcg total) by mouth once daily. 90 tablet 1    nebulizer and compressor Ivon Use as directed      rosuvastatin (CRESTOR) 10 MG tablet Take 1 tablet (10 mg total) by mouth once daily. 30 tablet 5    TRELEGY  ELLIPTA 100-62.5-25 mcg DsDv Take 1 puff by mouth once daily. 60 each 5    blood pressure monitor Kit 1 kit by Misc.(Non-Drug; Combo Route) route once daily. 1 each 0    clotrimazole (LOTRIMIN) 1 % cream Apply topically 2 (two) times daily. 24 g 1     No current facility-administered medications on file prior to visit.       Medications have been reviewed and reconciled with patient at visit today.    Barriers to medications present (no )    Fall since last office visit (no )      Exam:  Vitals:    11/30/22 1416   BP: 136/63   Pulse: 82   Temp: 97.4 °F (36.3 °C)     Weight: 50.6 kg (111 lb 9.6 oz)   Body mass index is 15.57 kg/m².      BP Readings from Last 3 Encounters:   11/30/22 136/63   10/28/22 (!) 186/77   10/14/22 131/62     Wt Readings from Last 3 Encounters:   11/30/22 1416 50.6 kg (111 lb 9.6 oz)   10/28/22 1455 49.6 kg (109 lb 6.4 oz)   10/14/22 1421 48.8 kg (107 lb 9.6 oz)            Physical Exam  Neurological:      Mental Status: He is alert.       Laboratory Reviewed: (Yes)  Lab Results   Component Value Date    WBC 7.94 08/29/2022    HGB 11.6 (L) 08/29/2022    HCT 36.4 (L) 08/29/2022     08/29/2022    CHOL 188 08/29/2022    TRIG 93 08/29/2022    HDL 50 08/29/2022    ALT 14 06/26/2022    AST 22 06/26/2022     08/29/2022    K 4.9 08/29/2022     08/29/2022    CREATININE 1.8 (H) 08/29/2022    BUN 27 (H) 08/29/2022    CO2 26 08/29/2022    TSH 1.404 08/29/2022    HGBA1C 5.7 (H) 08/29/2022           Health Maintenance  Health Maintenance Topics with due status: Not Due       Topic Last Completion Date    Hemoglobin A1c (Prediabetes) 08/29/2022    Lipid Panel 08/29/2022     Health Maintenance Due   Topic Date Due    TETANUS VACCINE  Never done    Shingles Vaccine (1 of 2) Never done    COVID-19 Vaccine (3 - Booster for Pfizer series) 04/15/2021       Assessment:  Problem List Items Addressed This Visit          Renal/    Stage 3 chronic kidney disease     Will repeat CMP today.  Improved  BP control.             Oncology    Anemia due to stage 3a chronic kidney disease - Primary (Chronic)    Relevant Orders    Comprehensive Metabolic Panel         Plan:  Anemia due to stage 3a chronic kidney disease  -     Comprehensive Metabolic Panel; Future; Expected date: 11/30/2022    Stage 3b chronic kidney disease    -Patient's lab results were reviewed and discussed with patient  -Treatment options and alternatives were discussed with the patient. Patient expressed understanding. Patient was given the opportunity to ask questions and be an active participant in their medical care. Patient had no further questions or concerns at this time.   -Documentation of patient's health and condition was obtained from family member who was present during visit.  -Patient is an overall moderate risk for health complications from their medical conditions.       Follow up: Follow up in about 2 months (around 1/30/2023).      After visit summary printed and given to patient upon discharge.  Patient goals and care plan are included in After visit summary.    Total medical decision making time was 18 min.  The following issues were discussed: The primary encounter diagnosis was Anemia due to stage 3a chronic kidney disease. A diagnosis of Stage 3b chronic kidney disease was also pertinent to this visit.    Health maintenance needs, recent test results and goals of care discussed with pt and questions answered.

## 2022-11-30 ENCOUNTER — OFFICE VISIT (OUTPATIENT)
Dept: PRIMARY CARE CLINIC | Facility: CLINIC | Age: 82
End: 2022-11-30
Payer: MEDICARE

## 2022-11-30 VITALS
TEMPERATURE: 97 F | OXYGEN SATURATION: 99 % | HEART RATE: 82 BPM | HEIGHT: 71 IN | WEIGHT: 111.63 LBS | BODY MASS INDEX: 15.63 KG/M2 | SYSTOLIC BLOOD PRESSURE: 136 MMHG | DIASTOLIC BLOOD PRESSURE: 63 MMHG

## 2022-11-30 DIAGNOSIS — D63.1 ANEMIA DUE TO STAGE 3A CHRONIC KIDNEY DISEASE: Primary | Chronic | ICD-10-CM

## 2022-11-30 DIAGNOSIS — N18.32 STAGE 3B CHRONIC KIDNEY DISEASE: ICD-10-CM

## 2022-11-30 DIAGNOSIS — N18.31 ANEMIA DUE TO STAGE 3A CHRONIC KIDNEY DISEASE: Primary | Chronic | ICD-10-CM

## 2022-11-30 DIAGNOSIS — H53.8 BLURRED VISION, BILATERAL: ICD-10-CM

## 2022-11-30 PROBLEM — N18.30 STAGE 3 CHRONIC KIDNEY DISEASE: Status: ACTIVE | Noted: 2022-11-30

## 2022-11-30 PROCEDURE — 1160F PR REVIEW ALL MEDS BY PRESCRIBER/CLIN PHARMACIST DOCUMENTED: ICD-10-PCS | Mod: CPTII,S$GLB,, | Performed by: NURSE PRACTITIONER

## 2022-11-30 PROCEDURE — 3078F PR MOST RECENT DIASTOLIC BLOOD PRESSURE < 80 MM HG: ICD-10-PCS | Mod: CPTII,S$GLB,, | Performed by: NURSE PRACTITIONER

## 2022-11-30 PROCEDURE — 1126F PR PAIN SEVERITY QUANTIFIED, NO PAIN PRESENT: ICD-10-PCS | Mod: CPTII,S$GLB,, | Performed by: NURSE PRACTITIONER

## 2022-11-30 PROCEDURE — 1101F PR PT FALLS ASSESS DOC 0-1 FALLS W/OUT INJ PAST YR: ICD-10-PCS | Mod: CPTII,S$GLB,, | Performed by: NURSE PRACTITIONER

## 2022-11-30 PROCEDURE — 1159F PR MEDICATION LIST DOCUMENTED IN MEDICAL RECORD: ICD-10-PCS | Mod: CPTII,S$GLB,, | Performed by: NURSE PRACTITIONER

## 2022-11-30 PROCEDURE — 3288F PR FALLS RISK ASSESSMENT DOCUMENTED: ICD-10-PCS | Mod: CPTII,S$GLB,, | Performed by: NURSE PRACTITIONER

## 2022-11-30 PROCEDURE — 1101F PT FALLS ASSESS-DOCD LE1/YR: CPT | Mod: CPTII,S$GLB,, | Performed by: NURSE PRACTITIONER

## 2022-11-30 PROCEDURE — 3075F PR MOST RECENT SYSTOLIC BLOOD PRESS GE 130-139MM HG: ICD-10-PCS | Mod: CPTII,S$GLB,, | Performed by: NURSE PRACTITIONER

## 2022-11-30 PROCEDURE — 3075F SYST BP GE 130 - 139MM HG: CPT | Mod: CPTII,S$GLB,, | Performed by: NURSE PRACTITIONER

## 2022-11-30 PROCEDURE — 1160F RVW MEDS BY RX/DR IN RCRD: CPT | Mod: CPTII,S$GLB,, | Performed by: NURSE PRACTITIONER

## 2022-11-30 PROCEDURE — 1159F MED LIST DOCD IN RCRD: CPT | Mod: CPTII,S$GLB,, | Performed by: NURSE PRACTITIONER

## 2022-11-30 PROCEDURE — 99999 PR PBB SHADOW E&M-EST. PATIENT-LVL III: CPT | Mod: PBBFAC,,, | Performed by: NURSE PRACTITIONER

## 2022-11-30 PROCEDURE — 3078F DIAST BP <80 MM HG: CPT | Mod: CPTII,S$GLB,, | Performed by: NURSE PRACTITIONER

## 2022-11-30 PROCEDURE — 99999 PR PBB SHADOW E&M-EST. PATIENT-LVL III: ICD-10-PCS | Mod: PBBFAC,,, | Performed by: NURSE PRACTITIONER

## 2022-11-30 PROCEDURE — 99212 OFFICE O/P EST SF 10 MIN: CPT | Mod: S$GLB,,, | Performed by: NURSE PRACTITIONER

## 2022-11-30 PROCEDURE — 99212 PR OFFICE/OUTPT VISIT, EST, LEVL II, 10-19 MIN: ICD-10-PCS | Mod: S$GLB,,, | Performed by: NURSE PRACTITIONER

## 2022-11-30 PROCEDURE — 1126F AMNT PAIN NOTED NONE PRSNT: CPT | Mod: CPTII,S$GLB,, | Performed by: NURSE PRACTITIONER

## 2022-11-30 PROCEDURE — 3288F FALL RISK ASSESSMENT DOCD: CPT | Mod: CPTII,S$GLB,, | Performed by: NURSE PRACTITIONER

## 2022-12-06 RX ORDER — UBIDECARENONE 75 MG
500 CAPSULE ORAL DAILY
Qty: 90 TABLET | Refills: 1 | Status: SHIPPED | OUTPATIENT
Start: 2022-12-06 | End: 2023-06-06

## 2022-12-06 NOTE — TELEPHONE ENCOUNTER
Patient Rx order was resubmitted to the provider, to be sent to [facility] on [address] and [address].

## 2022-12-06 NOTE — TELEPHONE ENCOUNTER
----- Message from Eduardo Sorenson sent at 12/5/2022  1:52 PM CST -----  Contact: [phone]  On the paper was supposed to be prescribed cyanococalanis, but no prescription as sent out.     Ph[facility] on [address]

## 2022-12-08 ENCOUNTER — IMMUNIZATION (OUTPATIENT)
Dept: PRIMARY CARE CLINIC | Facility: CLINIC | Age: 82
End: 2022-12-08
Payer: MEDICARE

## 2022-12-08 DIAGNOSIS — Z23 NEED FOR VACCINATION: Primary | ICD-10-CM

## 2022-12-08 PROCEDURE — 91312 COVID-19, MRNA, LNP-S, BIVALENT BOOSTER, PF, 30 MCG/0.3 ML DOSE: CPT | Mod: PBBFAC | Performed by: FAMILY MEDICINE

## 2022-12-08 PROCEDURE — 0124A COVID-19, MRNA, LNP-S, BIVALENT BOOSTER, PF, 30 MCG/0.3 ML DOSE: CPT | Mod: CV19,PBBFAC | Performed by: FAMILY MEDICINE

## 2023-01-30 ENCOUNTER — OFFICE VISIT (OUTPATIENT)
Dept: PRIMARY CARE CLINIC | Facility: CLINIC | Age: 83
End: 2023-01-30
Payer: MEDICARE

## 2023-01-30 VITALS
BODY MASS INDEX: 15.59 KG/M2 | HEIGHT: 71 IN | TEMPERATURE: 97 F | WEIGHT: 111.38 LBS | SYSTOLIC BLOOD PRESSURE: 126 MMHG | HEART RATE: 75 BPM | OXYGEN SATURATION: 100 % | DIASTOLIC BLOOD PRESSURE: 59 MMHG

## 2023-01-30 DIAGNOSIS — J30.9 ALLERGIC RHINITIS, UNSPECIFIED SEASONALITY, UNSPECIFIED TRIGGER: ICD-10-CM

## 2023-01-30 DIAGNOSIS — R64 CACHEXIA: ICD-10-CM

## 2023-01-30 DIAGNOSIS — E78.5 HYPERLIPIDEMIA, UNSPECIFIED HYPERLIPIDEMIA TYPE: ICD-10-CM

## 2023-01-30 DIAGNOSIS — F10.21 ALCOHOL USE DISORDER, MODERATE, IN SUSTAINED REMISSION: ICD-10-CM

## 2023-01-30 DIAGNOSIS — J43.2 CENTRILOBULAR EMPHYSEMA: Primary | ICD-10-CM

## 2023-01-30 DIAGNOSIS — I73.9 PERIPHERAL VASCULAR DISEASE: ICD-10-CM

## 2023-01-30 DIAGNOSIS — N18.32 STAGE 3B CHRONIC KIDNEY DISEASE: ICD-10-CM

## 2023-01-30 DIAGNOSIS — I50.9 CONGESTIVE HEART FAILURE, UNSPECIFIED HF CHRONICITY, UNSPECIFIED HEART FAILURE TYPE: ICD-10-CM

## 2023-01-30 DIAGNOSIS — D68.9 COAGULOPATHY: ICD-10-CM

## 2023-01-30 PROCEDURE — 3074F PR MOST RECENT SYSTOLIC BLOOD PRESSURE < 130 MM HG: ICD-10-PCS | Mod: CPTII,S$GLB,, | Performed by: NURSE PRACTITIONER

## 2023-01-30 PROCEDURE — 99999 PR PBB SHADOW E&M-EST. PATIENT-LVL III: CPT | Mod: PBBFAC,,, | Performed by: NURSE PRACTITIONER

## 2023-01-30 PROCEDURE — 1160F RVW MEDS BY RX/DR IN RCRD: CPT | Mod: CPTII,S$GLB,, | Performed by: NURSE PRACTITIONER

## 2023-01-30 PROCEDURE — 3288F FALL RISK ASSESSMENT DOCD: CPT | Mod: CPTII,S$GLB,, | Performed by: NURSE PRACTITIONER

## 2023-01-30 PROCEDURE — 1101F PR PT FALLS ASSESS DOC 0-1 FALLS W/OUT INJ PAST YR: ICD-10-PCS | Mod: CPTII,S$GLB,, | Performed by: NURSE PRACTITIONER

## 2023-01-30 PROCEDURE — 99499 UNLISTED E&M SERVICE: CPT | Mod: S$GLB,,, | Performed by: NURSE PRACTITIONER

## 2023-01-30 PROCEDURE — 1160F PR REVIEW ALL MEDS BY PRESCRIBER/CLIN PHARMACIST DOCUMENTED: ICD-10-PCS | Mod: CPTII,S$GLB,, | Performed by: NURSE PRACTITIONER

## 2023-01-30 PROCEDURE — 3078F DIAST BP <80 MM HG: CPT | Mod: CPTII,S$GLB,, | Performed by: NURSE PRACTITIONER

## 2023-01-30 PROCEDURE — 1159F PR MEDICATION LIST DOCUMENTED IN MEDICAL RECORD: ICD-10-PCS | Mod: CPTII,S$GLB,, | Performed by: NURSE PRACTITIONER

## 2023-01-30 PROCEDURE — 99499 RISK ADDL DX/OHS AUDIT: ICD-10-PCS | Mod: S$GLB,,, | Performed by: NURSE PRACTITIONER

## 2023-01-30 PROCEDURE — 1126F PR PAIN SEVERITY QUANTIFIED, NO PAIN PRESENT: ICD-10-PCS | Mod: CPTII,S$GLB,, | Performed by: NURSE PRACTITIONER

## 2023-01-30 PROCEDURE — 99214 OFFICE O/P EST MOD 30 MIN: CPT | Mod: S$GLB,,, | Performed by: NURSE PRACTITIONER

## 2023-01-30 PROCEDURE — 99999 PR PBB SHADOW E&M-EST. PATIENT-LVL III: ICD-10-PCS | Mod: PBBFAC,,, | Performed by: NURSE PRACTITIONER

## 2023-01-30 PROCEDURE — 3288F PR FALLS RISK ASSESSMENT DOCUMENTED: ICD-10-PCS | Mod: CPTII,S$GLB,, | Performed by: NURSE PRACTITIONER

## 2023-01-30 PROCEDURE — 3074F SYST BP LT 130 MM HG: CPT | Mod: CPTII,S$GLB,, | Performed by: NURSE PRACTITIONER

## 2023-01-30 PROCEDURE — 1101F PT FALLS ASSESS-DOCD LE1/YR: CPT | Mod: CPTII,S$GLB,, | Performed by: NURSE PRACTITIONER

## 2023-01-30 PROCEDURE — 3078F PR MOST RECENT DIASTOLIC BLOOD PRESSURE < 80 MM HG: ICD-10-PCS | Mod: CPTII,S$GLB,, | Performed by: NURSE PRACTITIONER

## 2023-01-30 PROCEDURE — 99214 PR OFFICE/OUTPT VISIT, EST, LEVL IV, 30-39 MIN: ICD-10-PCS | Mod: S$GLB,,, | Performed by: NURSE PRACTITIONER

## 2023-01-30 PROCEDURE — 1159F MED LIST DOCD IN RCRD: CPT | Mod: CPTII,S$GLB,, | Performed by: NURSE PRACTITIONER

## 2023-01-30 PROCEDURE — 1126F AMNT PAIN NOTED NONE PRSNT: CPT | Mod: CPTII,S$GLB,, | Performed by: NURSE PRACTITIONER

## 2023-01-30 RX ORDER — AZELASTINE 1 MG/ML
1 SPRAY, METERED NASAL 2 TIMES DAILY PRN
Qty: 30 ML | Refills: 11 | Status: SHIPPED | OUTPATIENT
Start: 2023-01-30 | End: 2024-02-12

## 2023-01-30 RX ORDER — FLUTICASONE FUROATE, UMECLIDINIUM BROMIDE AND VILANTEROL TRIFENATATE 100; 62.5; 25 UG/1; UG/1; UG/1
1 POWDER RESPIRATORY (INHALATION) DAILY
Qty: 60 EACH | Refills: 5 | Status: SHIPPED | OUTPATIENT
Start: 2023-01-30 | End: 2023-03-30

## 2023-01-30 RX ORDER — ROSUVASTATIN CALCIUM 10 MG/1
10 TABLET, COATED ORAL DAILY
Qty: 30 TABLET | Refills: 5 | Status: SHIPPED | OUTPATIENT
Start: 2023-01-30 | End: 2023-04-06 | Stop reason: SDUPTHER

## 2023-01-30 NOTE — PROGRESS NOTES
[facility]  01[facility]23  [id number]    Trinidad Bai MD  Patient Care Team:  Trinidad Bai MD as PCP - General (Internal Medicine)  Dwight Rubio MD as Consulting Physician (Otolaryngology)  Joan Cadena, NP as Nurse Practitioner (Family Medicine)  Alice Ruiz NP as Nurse Practitioner (Family Medicine)      [facility] Note      Chief Complaint:  Chief Complaint   Patient presents with    3 mth f/u        History of Present Illness:  HPI    Breathing is "pretty good."  Coughs occasionally, NP, sneezing lately.   No other sx. Feels good overall.          Review of Systems   Constitutional:  Negative for chills, fever and malaise/fatigue.   HENT:  Positive for congestion.         Sneezing and nasal congestion for one month.    Respiratory:  Positive for cough. Negative for shortness of breath and wheezing.    Cardiovascular:  Negative for chest pain, palpitations and leg swelling.   Gastrointestinal:  Negative for abdominal pain, diarrhea, heartburn, nausea and vomiting.   Genitourinary:  Negative for dysuria.   Musculoskeletal:  Negative for myalgias.   Neurological:  Negative for dizziness and headaches.   Psychiatric/Behavioral:  Negative for depression.        The following were reviewed: Active problem list, medication list, allergies, family history, social history, and Health Maintenance.     History:  Past Medical History:   Diagnosis Date    Carotid artery disease      8/30/17 shows 50% stenosis    Cataract     COPD (chronic obstructive pulmonary disease)     History of basal cell cancer      Past Surgical History:   Procedure Laterality Date    CATARACT EXTRACTION, BILATERAL       Family History   Problem Relation Age of Onset    Diabetes Maternal Grandmother      Social History     Socioeconomic History    Marital status:    Tobacco Use    Smoking status: Every Day     Packs/day: 1.00     Years: 70.00     Pack years: 70.00     Types: Cigarettes    " Smokeless tobacco: Never   Substance and Sexual Activity    Alcohol use: No    Drug use: No    Sexual activity: Never     Patient Active Problem List   Diagnosis    Chronic obstructive pulmonary disease    Nicotine dependence, cigarettes, with other nicotine-induced disorders    Anemia due to stage 3a chronic kidney disease    Encounter for hepatitis C screening test for low risk patient    Hyperlipidemia    Thrombocytopenia    Peripheral vascular disease    Onychomycosis    Tinea pedis of both feet    Centrilobular emphysema    Underweight    B12 deficiency    Congestive heart failure    Prediabetes    Thyroid nodule    Vitamin B12 deficiency (dietary) anemia    Bilateral impacted cerumen    Cerebral vascular disease    History of basal cell cancer    Carotid artery disease    Cachexia    Orthostasis    Renovascular hypertension    Bilateral hearing loss    Alcohol use disorder, moderate, in sustained remission    Stage 3 chronic kidney disease     Review of patient's allergies indicates:  No Known Allergies    Medications:  Current Outpatient Medications on File Prior to Visit   Medication Sig Dispense Refill    albuterol (PROVENTIL) 2.5 mg /3 mL (0.083 %) nebulizer solution Take 3 mLs (2.5 mg total) by nebulization every 4 (four) hours as needed for Wheezing or Shortness of Breath. 1 each 1    amLODIPine (NORVASC) 5 MG tablet Take 1 tablet (5 mg total) by mouth once daily. 30 tablet 11    aspirin (ECOTRIN) 81 MG EC tablet Take 1 tablet (81 mg total) by mouth once daily. 30 tablet 5    nebulizer and compressor Ivon Use as directed      [DISCONTINUED] rosuvastatin (CRESTOR) 10 MG tablet Take 1 tablet (10 mg total) by mouth once daily. 30 tablet 5    [DISCONTINUED] TRELEGY ELLIPTA 100-62.5-25 mcg DsDv Take 1 puff by mouth once daily. 60 each 5    blood pressure monitor Kit 1 kit by Misc.(Non-Drug; Combo Route) route once daily. (Patient not taking: Reported on 1/30/2023) 1 each 0    clotrimazole (LOTRIMIN) 1 % cream  Apply topically 2 (two) times daily. 24 g 1    cyanocobalamin 500 MCG tablet Take 1 tablet (500 mcg total) by mouth once daily. (Patient not taking: Reported on 1/30/2023) 90 tablet 1     No current facility-administered medications on file prior to visit.       Medications have been reviewed and reconciled with patient at visit today.    Barriers to medications present (no )    Fall since last office visit (no )      Exam:  Vitals:    01/30/23 1452   BP: (!) 126/59   Pulse: 75   Temp: 97.3 °F (36.3 °C)     Weight: 50.5 kg (111 lb 6.4 oz)   Body mass index is 15.54 kg/m².      BP Readings from Last 3 Encounters:   01/30/23 (!) 126/59   11/30/22 136/63   10/28/22 (!) 186/77     Wt Readings from Last 3 Encounters:   01/30/23 1452 50.5 kg (111 lb 6.4 oz)   11/30/22 1416 50.6 kg (111 lb 9.6 oz)   10/28/22 1455 49.6 kg (109 lb 6.4 oz)            Physical Exam  Constitutional:       General: He is not in acute distress.     Comments: Cachectic, disheveled   HENT:      Head: Normocephalic.      Nose: Rhinorrhea present.      Mouth/Throat:      Mouth: Mucous membranes are moist.      Pharynx: No oropharyngeal exudate or posterior oropharyngeal erythema.   Eyes:      General: No scleral icterus.  Cardiovascular:      Rate and Rhythm: Normal rate and regular rhythm.      Heart sounds: Normal heart sounds.   Pulmonary:      Effort: Pulmonary effort is normal. No respiratory distress.      Breath sounds: No stridor. No wheezing, rhonchi or rales.      Comments: Lung sounds diminished throughout  Abdominal:      General: There is no distension.      Palpations: Abdomen is soft.      Tenderness: There is no abdominal tenderness.   Musculoskeletal:         General: No swelling.      Cervical back: Neck supple.   Skin:     General: Skin is warm and dry.   Neurological:      Mental Status: He is alert. Mental status is at baseline.   Psychiatric:         Mood and Affect: Mood normal.         Behavior: Behavior normal.        Laboratory Reviewed: (Yes)  Lab Results   Component Value Date    WBC 7.94 08/29/2022    HGB 11.6 (L) 08/29/2022    HCT 36.4 (L) 08/29/2022     08/29/2022    CHOL 188 08/29/2022    TRIG 93 08/29/2022    HDL 50 08/29/2022    ALT 14 06/26/2022    AST 22 06/26/2022     08/29/2022    K 4.9 08/29/2022     08/29/2022    CREATININE 1.8 (H) 08/29/2022    BUN 27 (H) 08/29/2022    CO2 26 08/29/2022    TSH 1.404 08/29/2022    HGBA1C 5.7 (H) 08/29/2022           Health Maintenance  Health Maintenance Topics with due status: Not Due       Topic Last Completion Date    Hemoglobin A1c (Prediabetes) 08/29/2022    Lipid Panel 08/29/2022     Health Maintenance Due   Topic Date Due    TETANUS VACCINE  Never done    Shingles Vaccine (1 of 2) Never done       Assessment:  Problem List Items Addressed This Visit          Psychiatric    Alcohol use disorder, moderate, in sustained remission     No sx, no bleeding.   Monitor.            Pulmonary    Centrilobular emphysema - Primary     Sx stable. Refill Trelegy.         Relevant Medications    TRELEGY ELLIPTA 100-62.5-25 mcg DsDv       Cardiac/Vascular    Peripheral vascular disease     Continue BP mgmt, statin.         Hyperlipidemia     Refill rosuvastatin.   Lab Results   Component Value Date    LDLCALC 119.4 08/29/2022              Relevant Medications    rosuvastatin (CRESTOR) 10 MG tablet    Congestive heart failure     Compensated. Continue amlodipine.            Renal/    Stage 3 chronic kidney disease     Repeat CMP today.  Continue HTN mgmt         Relevant Orders    Comprehensive Metabolic Panel       Other    Cachexia     Wt Readings from Last 3 Encounters:   01/30/23 1452 50.5 kg (111 lb 6.4 oz)   11/30/22 1416 50.6 kg (111 lb 9.6 oz)   10/28/22 1455 49.6 kg (109 lb 6.4 oz)   Monitor.          Other Visit Diagnoses       Allergic rhinitis, unspecified seasonality, unspecified trigger        Relevant Medications    azelastine (ASTELIN) 137 mcg (0.1  %) nasal spray    Coagulopathy                  Plan:  Centrilobular emphysema  -     TRELEGY ELLIPTA 100-62.5-25 mcg DsDv; Take 1 puff by mouth once daily.  Dispense: 60 each; Refill: 5    Stage 3b chronic kidney disease  -     Comprehensive Metabolic Panel; Future; Expected date: 01/30/2023    Hyperlipidemia, unspecified hyperlipidemia type  -     rosuvastatin (CRESTOR) 10 MG tablet; Take 1 tablet (10 mg total) by mouth once daily.  Dispense: 30 tablet; Refill: 5    Allergic rhinitis, unspecified seasonality, unspecified trigger  -     azelastine (ASTELIN) 137 mcg (0.1 %) nasal spray; 1 spray (137 mcg total) by Nasal route 2 (two) times daily as needed for Rhinitis (sneezing).  Dispense: 30 mL; Refill: 11    Cachexia    Coagulopathy    Alcohol use disorder, moderate, in sustained remission    Congestive heart failure, unspecified HF chronicity, unspecified heart failure type    Peripheral vascular disease      -Patient's lab results were reviewed and discussed with patient  -Treatment options and alternatives were discussed with the patient. Patient expressed understanding. Patient was given the opportunity to ask questions and be an active participant in their medical care. Patient had no further questions or concerns at this time.   -Documentation of patient's health and condition was obtained from family member who was present during visit.  -Patient is an overall moderate risk for health complications from their medical conditions.       Follow up: Follow up in about 4 weeks (around 2/27/2023).      After visit summary printed and given to patient upon discharge.  Patient goals and care plan are included in After visit summary.    Total medical decision making time was 31 min.  The following issues were discussed: The primary encounter diagnosis was Centrilobular emphysema. Diagnoses of Stage 3b chronic kidney disease, Hyperlipidemia, unspecified hyperlipidemia type, Allergic rhinitis, unspecified seasonality,  unspecified trigger, Cachexia, Coagulopathy, Alcohol use disorder, moderate, in sustained remission, Congestive heart failure, unspecified HF chronicity, unspecified heart failure type, and Peripheral vascular disease were also pertinent to this visit.    Health maintenance needs, recent test results and goals of care discussed with pt and questions answered.

## 2023-01-30 NOTE — ASSESSMENT & PLAN NOTE
Wt Readings from Last 3 Encounters:   01/30/23 1452 50.5 kg (111 lb 6.4 oz)   11/30/22 1416 50.6 kg (111 lb 9.6 oz)   10/28/22 1455 49.6 kg (109 lb 6.4 oz)     Monitor.

## 2023-03-06 ENCOUNTER — OFFICE VISIT (OUTPATIENT)
Dept: PRIMARY CARE CLINIC | Facility: CLINIC | Age: 83
End: 2023-03-06
Payer: MEDICARE

## 2023-03-06 VITALS
BODY MASS INDEX: 15.66 KG/M2 | SYSTOLIC BLOOD PRESSURE: 126 MMHG | DIASTOLIC BLOOD PRESSURE: 58 MMHG | HEART RATE: 68 BPM | WEIGHT: 112.31 LBS | TEMPERATURE: 98 F | OXYGEN SATURATION: 99 %

## 2023-03-06 DIAGNOSIS — I50.9 CONGESTIVE HEART FAILURE, UNSPECIFIED HF CHRONICITY, UNSPECIFIED HEART FAILURE TYPE: ICD-10-CM

## 2023-03-06 DIAGNOSIS — R73.03 PREDIABETES: ICD-10-CM

## 2023-03-06 DIAGNOSIS — J43.2 CENTRILOBULAR EMPHYSEMA: Primary | ICD-10-CM

## 2023-03-06 PROCEDURE — 1126F PR PAIN SEVERITY QUANTIFIED, NO PAIN PRESENT: ICD-10-PCS | Mod: HCNC,CPTII,S$GLB, | Performed by: NURSE PRACTITIONER

## 2023-03-06 PROCEDURE — 3288F FALL RISK ASSESSMENT DOCD: CPT | Mod: HCNC,CPTII,S$GLB, | Performed by: NURSE PRACTITIONER

## 2023-03-06 PROCEDURE — 99213 PR OFFICE/OUTPT VISIT, EST, LEVL III, 20-29 MIN: ICD-10-PCS | Mod: HCNC,S$GLB,, | Performed by: NURSE PRACTITIONER

## 2023-03-06 PROCEDURE — 1159F PR MEDICATION LIST DOCUMENTED IN MEDICAL RECORD: ICD-10-PCS | Mod: HCNC,CPTII,S$GLB, | Performed by: NURSE PRACTITIONER

## 2023-03-06 PROCEDURE — 3078F DIAST BP <80 MM HG: CPT | Mod: HCNC,CPTII,S$GLB, | Performed by: NURSE PRACTITIONER

## 2023-03-06 PROCEDURE — 1160F RVW MEDS BY RX/DR IN RCRD: CPT | Mod: HCNC,CPTII,S$GLB, | Performed by: NURSE PRACTITIONER

## 2023-03-06 PROCEDURE — 99999 PR PBB SHADOW E&M-EST. PATIENT-LVL III: ICD-10-PCS | Mod: PBBFAC,HCNC,, | Performed by: NURSE PRACTITIONER

## 2023-03-06 PROCEDURE — 1126F AMNT PAIN NOTED NONE PRSNT: CPT | Mod: HCNC,CPTII,S$GLB, | Performed by: NURSE PRACTITIONER

## 2023-03-06 PROCEDURE — 1101F PT FALLS ASSESS-DOCD LE1/YR: CPT | Mod: HCNC,CPTII,S$GLB, | Performed by: NURSE PRACTITIONER

## 2023-03-06 PROCEDURE — 1101F PR PT FALLS ASSESS DOC 0-1 FALLS W/OUT INJ PAST YR: ICD-10-PCS | Mod: HCNC,CPTII,S$GLB, | Performed by: NURSE PRACTITIONER

## 2023-03-06 PROCEDURE — 99999 PR PBB SHADOW E&M-EST. PATIENT-LVL III: CPT | Mod: PBBFAC,HCNC,, | Performed by: NURSE PRACTITIONER

## 2023-03-06 PROCEDURE — 3074F PR MOST RECENT SYSTOLIC BLOOD PRESSURE < 130 MM HG: ICD-10-PCS | Mod: HCNC,CPTII,S$GLB, | Performed by: NURSE PRACTITIONER

## 2023-03-06 PROCEDURE — 3074F SYST BP LT 130 MM HG: CPT | Mod: HCNC,CPTII,S$GLB, | Performed by: NURSE PRACTITIONER

## 2023-03-06 PROCEDURE — 3078F PR MOST RECENT DIASTOLIC BLOOD PRESSURE < 80 MM HG: ICD-10-PCS | Mod: HCNC,CPTII,S$GLB, | Performed by: NURSE PRACTITIONER

## 2023-03-06 PROCEDURE — 99213 OFFICE O/P EST LOW 20 MIN: CPT | Mod: HCNC,S$GLB,, | Performed by: NURSE PRACTITIONER

## 2023-03-06 PROCEDURE — 1160F PR REVIEW ALL MEDS BY PRESCRIBER/CLIN PHARMACIST DOCUMENTED: ICD-10-PCS | Mod: HCNC,CPTII,S$GLB, | Performed by: NURSE PRACTITIONER

## 2023-03-06 PROCEDURE — 3288F PR FALLS RISK ASSESSMENT DOCUMENTED: ICD-10-PCS | Mod: HCNC,CPTII,S$GLB, | Performed by: NURSE PRACTITIONER

## 2023-03-06 PROCEDURE — 1159F MED LIST DOCD IN RCRD: CPT | Mod: HCNC,CPTII,S$GLB, | Performed by: NURSE PRACTITIONER

## 2023-03-06 RX ORDER — ALBUTEROL SULFATE 90 UG/1
2 AEROSOL, METERED RESPIRATORY (INHALATION) EVERY 6 HOURS PRN
Qty: 18 G | Refills: 1 | Status: SHIPPED | OUTPATIENT
Start: 2023-03-06 | End: 2023-09-12 | Stop reason: SDUPTHER

## 2023-03-06 NOTE — PROGRESS NOTES
Rigoberto Perez  03/08/2023  [id number]    Trinidad Bai MD  Patient Care Team:  Trinidad Bai MD as PCP - General (Internal Medicine)  Dwight Rubio MD as Consulting Physician (Otolaryngology)  Joan Cadena, NP as Nurse Practitioner (Family Medicine)  Alice Ruiz NP as Nurse Practitioner (Family Medicine)      [facility] Note      Chief Complaint:  Chief Complaint   Patient presents with    Follow-up       History of Present Illness:  HPI    One month follow up emphysema, Anemia, HLD, HF.   Prescribed azelastine last visit. Sx decreased with this.   Refilled Trelegy. Using this daily.     Uses spouse's Duoneb 1-2 times/week for dyspnea. This is effective. Normal NMST at [place] in 2021. Sx are not worse since then. Continues to smoke about 1/2 PPD.     Myalgias BLEs, occasional. Most recently 2 days ago. Brief episodes. Pt attributes to pushing spouse in w/c.       Review of Systems   Constitutional:  Negative for chills and fever.   Respiratory:  Positive for shortness of breath. Negative for cough and wheezing.    Cardiovascular:  Negative for chest pain and palpitations.   Gastrointestinal:  Negative for abdominal pain, constipation, diarrhea, heartburn, nausea and vomiting.   Genitourinary:  Negative for dysuria and frequency.       The following were reviewed: Active problem list, medication list, allergies, family history, social history, and Health Maintenance.     History:  Past Medical History:   Diagnosis Date    Carotid artery disease      8/30/17 shows 50% stenosis    Cataract     COPD (chronic obstructive pulmonary disease)     History of basal cell cancer      Past Surgical History:   Procedure Laterality Date    CATARACT EXTRACTION, BILATERAL       Family History   Problem Relation Age of Onset    Diabetes Maternal Grandmother      Social History     Socioeconomic History    Marital status:    Tobacco Use    Smoking status: Every Day     Packs/day:  1.00     Years: 70.00     Pack years: 70.00     Types: Cigarettes    Smokeless tobacco: Never   Substance and Sexual Activity    Alcohol use: No    Drug use: No    Sexual activity: Never     Patient Active Problem List   Diagnosis    Chronic obstructive pulmonary disease    Nicotine dependence, cigarettes, with other nicotine-induced disorders    Anemia due to stage 3a chronic kidney disease    Encounter for hepatitis C screening test for low risk patient    Hyperlipidemia    Thrombocytopenia    Peripheral vascular disease    Onychomycosis    Tinea pedis of both feet    Centrilobular emphysema    Underweight    B12 deficiency    Congestive heart failure    Prediabetes    Thyroid nodule    Vitamin B12 deficiency (dietary) anemia    Bilateral impacted cerumen    Cerebral vascular disease    History of basal cell cancer    Carotid artery disease    Cachexia    Orthostasis    Renovascular hypertension    Bilateral hearing loss    Alcohol use disorder, moderate, in sustained remission    Stage 3 chronic kidney disease     Review of patient's allergies indicates:  No Known Allergies    Medications:  Current Outpatient Medications on File Prior to Visit   Medication Sig Dispense Refill    amLODIPine (NORVASC) 5 MG tablet Take 1 tablet (5 mg total) by mouth once daily. 30 tablet 11    aspirin (ECOTRIN) 81 MG EC tablet Take 1 tablet (81 mg total) by mouth once daily. 30 tablet 5    azelastine (ASTELIN) 137 mcg (0.1 %) nasal spray 1 spray (137 mcg total) by Nasal route 2 (two) times daily as needed for Rhinitis (sneezing). 30 mL 11    clotrimazole (LOTRIMIN) 1 % cream Apply topically 2 (two) times daily. 24 g 1    cyanocobalamin 500 MCG tablet Take 1 tablet (500 mcg total) by mouth once daily. 90 tablet 1    rosuvastatin (CRESTOR) 10 MG tablet Take 1 tablet (10 mg total) by mouth once daily. 30 tablet 5    TRELEGY ELLIPTA 100-62.5-25 mcg DsDv Take 1 puff by mouth once daily. 60 each 5    blood pressure monitor Kit 1 kit by  Misc.(Non-Drug; Combo Route) route once daily. (Patient not taking: Reported on 1/30/2023) 1 each 0    nebulizer and compressor Ivon Use as directed       No current facility-administered medications on file prior to visit.       Medications have been reviewed and reconciled with patient at visit today.    Barriers to medications present (yes )    Fall since last office visit (no )      Exam:  Vitals:    03/06/23 1354   BP: (!) 126/58   Pulse: 68   Temp: 97.9 °F (36.6 °C)     Weight: 50.9 kg (112 lb 4.8 oz)   Body mass index is 15.66 kg/m².      BP Readings from Last 3 Encounters:   03/06/23 (!) 126/58   01/30/23 (!) 126/59   11/30/22 136/63     Wt Readings from Last 3 Encounters:   03/06/23 1354 50.9 kg (112 lb 4.8 oz)   01/30/23 1452 50.5 kg (111 lb 6.4 oz)   11/30/22 1416 50.6 kg (111 lb 9.6 oz)            Physical Exam    Laboratory Reviewed: (Yes)  Lab Results   Component Value Date    WBC 7.94 08/29/2022    HGB 11.6 (L) 08/29/2022    HCT 36.4 (L) 08/29/2022     08/29/2022    CHOL 188 08/29/2022    TRIG 93 08/29/2022    HDL 50 08/29/2022    ALT 14 06/26/2022    AST 22 06/26/2022     08/29/2022    K 4.9 08/29/2022     08/29/2022    CREATININE 1.8 (H) 08/29/2022    BUN 27 (H) 08/29/2022    CO2 26 08/29/2022    TSH 1.404 08/29/2022    HGBA1C 5.7 (H) 08/29/2022           Health Maintenance  Health Maintenance Topics with due status: Not Due       Topic Last Completion Date    Hemoglobin A1c (Prediabetes) 08/29/2022    Lipid Panel 08/29/2022     Health Maintenance Due   Topic Date Due    TETANUS VACCINE  Never done    Shingles Vaccine (1 of 2) Never done       Assessment:  Problem List Items Addressed This Visit          Pulmonary    Centrilobular emphysema - Primary     Sx are stable. Continue albuterol PRN         Relevant Medications    albuterol (PROAIR HFA) 90 mcg/actuation inhaler       Cardiac/Vascular    Congestive heart failure     No acute sx. Continue POC.           Relevant Orders     CBC Auto Differential    Comprehensive Metabolic Panel    TSH       Endocrine    Prediabetes     Lab Results   Component Value Date    HGBA1C 5.7 (H) 08/29/2022   Repeat A1C.           Relevant Orders    Hemoglobin A1C         Plan:  Centrilobular emphysema  -     albuterol (PROAIR HFA) 90 mcg/actuation inhaler; Inhale 2 puffs into the lungs every 6 (six) hours as needed for Wheezing. Rescue  Dispense: 18 g; Refill: 1    Congestive heart failure, unspecified HF chronicity, unspecified heart failure type  -     CBC Auto Differential; Future; Expected date: 03/06/2023  -     Comprehensive Metabolic Panel; Future; Expected date: 03/06/2023  -     TSH; Future; Expected date: 03/06/2023    Prediabetes  -     Hemoglobin A1C; Future; Expected date: 03/06/2023      -Patient's lab results were reviewed and discussed with patient  -Treatment options and alternatives were discussed with the patient. Patient expressed understanding. Patient was given the opportunity to ask questions and be an active participant in their medical care. Patient had no further questions or concerns at this time.   -Documentation of patient's health and condition was obtained from family member who was present during visit.  -Patient is an overall moderate risk for health complications from their medical conditions.       Follow up: Follow up in about 3 months (around 6/6/2023).      After visit summary printed and given to patient upon discharge.  Patient goals and care plan are included in After visit summary.    Total medical decision making time was 21 min.  The following issues were discussed: The primary encounter diagnosis was Centrilobular emphysema. Diagnoses of Congestive heart failure, unspecified HF chronicity, unspecified heart failure type and Prediabetes were also pertinent to this visit.    Health maintenance needs, recent test results and goals of care discussed with pt and questions answered.

## 2023-03-08 ENCOUNTER — OFFICE VISIT (OUTPATIENT)
Dept: OPHTHALMOLOGY | Facility: CLINIC | Age: 83
End: 2023-03-08
Payer: MEDICARE

## 2023-03-08 DIAGNOSIS — H53.8 BLURRED VISION, BILATERAL: ICD-10-CM

## 2023-03-08 DIAGNOSIS — H52.7 REFRACTIVE ERRORS: ICD-10-CM

## 2023-03-08 DIAGNOSIS — Z96.1 PSEUDOPHAKIA OF BOTH EYES: Primary | ICD-10-CM

## 2023-03-08 PROCEDURE — 92015 PR REFRACTION: ICD-10-PCS | Mod: HCNC,S$GLB,, | Performed by: OPTOMETRIST

## 2023-03-08 PROCEDURE — 1159F PR MEDICATION LIST DOCUMENTED IN MEDICAL RECORD: ICD-10-PCS | Mod: HCNC,CPTII,S$GLB, | Performed by: OPTOMETRIST

## 2023-03-08 PROCEDURE — 92004 COMPRE OPH EXAM NEW PT 1/>: CPT | Mod: HCNC,S$GLB,, | Performed by: OPTOMETRIST

## 2023-03-08 PROCEDURE — 92004 PR EYE EXAM, NEW PATIENT,COMPREHESV: ICD-10-PCS | Mod: HCNC,S$GLB,, | Performed by: OPTOMETRIST

## 2023-03-08 PROCEDURE — 92015 DETERMINE REFRACTIVE STATE: CPT | Mod: HCNC,S$GLB,, | Performed by: OPTOMETRIST

## 2023-03-08 PROCEDURE — 99999 PR PBB SHADOW E&M-EST. PATIENT-LVL III: CPT | Mod: PBBFAC,HCNC,, | Performed by: OPTOMETRIST

## 2023-03-08 PROCEDURE — 1159F MED LIST DOCD IN RCRD: CPT | Mod: HCNC,CPTII,S$GLB, | Performed by: OPTOMETRIST

## 2023-03-08 PROCEDURE — 99999 PR PBB SHADOW E&M-EST. PATIENT-LVL III: ICD-10-PCS | Mod: PBBFAC,HCNC,, | Performed by: OPTOMETRIST

## 2023-03-08 NOTE — PROGRESS NOTES
HPI    No visual complaints  New patient last eye exam not sure.  Update glasses RX.    Last edited by Zaida Nuñez MA on 3/8/2023 10:27 AM.            Assessment /Plan     For exam results, see Encounter Report.    Pseudophakia of both eyes    Blurred vision, bilateral  -     Ambulatory referral/consult to Optometry    Refractive errors      Stable IOL OU.    Dispense Final Rx for glasses.  RTC 1 year  Discussed above and answered questions.

## 2023-04-06 DIAGNOSIS — J43.2 CENTRILOBULAR EMPHYSEMA: ICD-10-CM

## 2023-04-06 DIAGNOSIS — E78.5 HYPERLIPIDEMIA, UNSPECIFIED HYPERLIPIDEMIA TYPE: ICD-10-CM

## 2023-04-06 RX ORDER — ROSUVASTATIN CALCIUM 10 MG/1
10 TABLET, COATED ORAL DAILY
Qty: 30 TABLET | Refills: 5 | Status: SHIPPED | OUTPATIENT
Start: 2023-04-06 | End: 2023-10-24 | Stop reason: SDUPTHER

## 2023-04-06 RX ORDER — FLUTICASONE FUROATE, UMECLIDINIUM BROMIDE AND VILANTEROL TRIFENATATE 100; 62.5; 25 UG/1; UG/1; UG/1
1 POWDER RESPIRATORY (INHALATION) DAILY
Qty: 30 EACH | Refills: 5 | Status: SHIPPED | OUTPATIENT
Start: 2023-04-06 | End: 2023-10-03

## 2023-04-18 ENCOUNTER — TELEPHONE (OUTPATIENT)
Dept: PRIMARY CARE CLINIC | Facility: CLINIC | Age: 83
End: 2023-04-18
Payer: MEDICARE

## 2023-04-27 ENCOUNTER — OFFICE VISIT (OUTPATIENT)
Dept: PRIMARY CARE CLINIC | Facility: CLINIC | Age: 83
End: 2023-04-27
Payer: MEDICARE

## 2023-04-27 VITALS
BODY MASS INDEX: 15.68 KG/M2 | WEIGHT: 112 LBS | RESPIRATION RATE: 16 BRPM | HEIGHT: 71 IN | OXYGEN SATURATION: 90 % | TEMPERATURE: 98 F | HEART RATE: 73 BPM | DIASTOLIC BLOOD PRESSURE: 62 MMHG | SYSTOLIC BLOOD PRESSURE: 126 MMHG

## 2023-04-27 DIAGNOSIS — J44.9 CHRONIC OBSTRUCTIVE PULMONARY DISEASE, UNSPECIFIED COPD TYPE: Primary | ICD-10-CM

## 2023-04-27 DIAGNOSIS — E78.5 HYPERLIPIDEMIA, UNSPECIFIED HYPERLIPIDEMIA TYPE: ICD-10-CM

## 2023-04-27 DIAGNOSIS — D63.1 ANEMIA DUE TO STAGE 3A CHRONIC KIDNEY DISEASE: Chronic | ICD-10-CM

## 2023-04-27 DIAGNOSIS — N18.31 ANEMIA DUE TO STAGE 3A CHRONIC KIDNEY DISEASE: Chronic | ICD-10-CM

## 2023-04-27 DIAGNOSIS — H91.93 BILATERAL HEARING LOSS, UNSPECIFIED HEARING LOSS TYPE: ICD-10-CM

## 2023-04-27 DIAGNOSIS — E04.1 THYROID NODULE: ICD-10-CM

## 2023-04-27 DIAGNOSIS — D69.6 THROMBOCYTOPENIA: ICD-10-CM

## 2023-04-27 DIAGNOSIS — Z99.81 DEPENDENCE ON SUPPLEMENTAL OXYGEN: ICD-10-CM

## 2023-04-27 DIAGNOSIS — F10.21 ALCOHOL USE DISORDER, MODERATE, IN SUSTAINED REMISSION: ICD-10-CM

## 2023-04-27 DIAGNOSIS — R63.6 UNDERWEIGHT: ICD-10-CM

## 2023-04-27 DIAGNOSIS — I15.0 RENOVASCULAR HYPERTENSION: ICD-10-CM

## 2023-04-27 DIAGNOSIS — I50.9 CONGESTIVE HEART FAILURE, UNSPECIFIED HF CHRONICITY, UNSPECIFIED HEART FAILURE TYPE: ICD-10-CM

## 2023-04-27 DIAGNOSIS — Z00.00 ENCOUNTER FOR PREVENTIVE HEALTH EXAMINATION: ICD-10-CM

## 2023-04-27 DIAGNOSIS — H43.12 VITREOUS HEMORRHAGE OF LEFT EYE: ICD-10-CM

## 2023-04-27 PROCEDURE — 1159F MED LIST DOCD IN RCRD: CPT | Mod: HCNC,CPTII,S$GLB, | Performed by: NURSE PRACTITIONER

## 2023-04-27 PROCEDURE — G0439 PR MEDICARE ANNUAL WELLNESS SUBSEQUENT VISIT: ICD-10-PCS | Mod: HCNC,S$GLB,, | Performed by: NURSE PRACTITIONER

## 2023-04-27 PROCEDURE — 1170F FXNL STATUS ASSESSED: CPT | Mod: HCNC,CPTII,S$GLB, | Performed by: NURSE PRACTITIONER

## 2023-04-27 PROCEDURE — G0439 PPPS, SUBSEQ VISIT: HCPCS | Mod: HCNC,S$GLB,, | Performed by: NURSE PRACTITIONER

## 2023-04-27 PROCEDURE — 1101F PT FALLS ASSESS-DOCD LE1/YR: CPT | Mod: HCNC,CPTII,S$GLB, | Performed by: NURSE PRACTITIONER

## 2023-04-27 PROCEDURE — 99999 PR PBB SHADOW E&M-EST. PATIENT-LVL V: ICD-10-PCS | Mod: PBBFAC,HCNC,, | Performed by: NURSE PRACTITIONER

## 2023-04-27 PROCEDURE — 3078F PR MOST RECENT DIASTOLIC BLOOD PRESSURE < 80 MM HG: ICD-10-PCS | Mod: HCNC,CPTII,S$GLB, | Performed by: NURSE PRACTITIONER

## 2023-04-27 PROCEDURE — 1126F PR PAIN SEVERITY QUANTIFIED, NO PAIN PRESENT: ICD-10-PCS | Mod: HCNC,CPTII,S$GLB, | Performed by: NURSE PRACTITIONER

## 2023-04-27 PROCEDURE — 1159F PR MEDICATION LIST DOCUMENTED IN MEDICAL RECORD: ICD-10-PCS | Mod: HCNC,CPTII,S$GLB, | Performed by: NURSE PRACTITIONER

## 2023-04-27 PROCEDURE — 3074F PR MOST RECENT SYSTOLIC BLOOD PRESSURE < 130 MM HG: ICD-10-PCS | Mod: HCNC,CPTII,S$GLB, | Performed by: NURSE PRACTITIONER

## 2023-04-27 PROCEDURE — 1101F PR PT FALLS ASSESS DOC 0-1 FALLS W/OUT INJ PAST YR: ICD-10-PCS | Mod: HCNC,CPTII,S$GLB, | Performed by: NURSE PRACTITIONER

## 2023-04-27 PROCEDURE — 1126F AMNT PAIN NOTED NONE PRSNT: CPT | Mod: HCNC,CPTII,S$GLB, | Performed by: NURSE PRACTITIONER

## 2023-04-27 PROCEDURE — 3288F FALL RISK ASSESSMENT DOCD: CPT | Mod: HCNC,CPTII,S$GLB, | Performed by: NURSE PRACTITIONER

## 2023-04-27 PROCEDURE — 3074F SYST BP LT 130 MM HG: CPT | Mod: HCNC,CPTII,S$GLB, | Performed by: NURSE PRACTITIONER

## 2023-04-27 PROCEDURE — 99999 PR PBB SHADOW E&M-EST. PATIENT-LVL V: CPT | Mod: PBBFAC,HCNC,, | Performed by: NURSE PRACTITIONER

## 2023-04-27 PROCEDURE — 3078F DIAST BP <80 MM HG: CPT | Mod: HCNC,CPTII,S$GLB, | Performed by: NURSE PRACTITIONER

## 2023-04-27 PROCEDURE — 3288F PR FALLS RISK ASSESSMENT DOCUMENTED: ICD-10-PCS | Mod: HCNC,CPTII,S$GLB, | Performed by: NURSE PRACTITIONER

## 2023-04-27 PROCEDURE — 1170F PR FUNCTIONAL STATUS ASSESSED: ICD-10-PCS | Mod: HCNC,CPTII,S$GLB, | Performed by: NURSE PRACTITIONER

## 2023-04-27 NOTE — PATIENT INSTRUCTIONS
Counseling and Referral of Other Preventative  (Italic type indicates deductible and co-insurance are waived)    Patient Name: Rigoberto Perez  Today's Date: 4/27/2023    Health Maintenance       Date Due Completion Date    TETANUS VACCINE Never done ---    Shingles Vaccine (1 of 2) Never done ---    Abdominal Aortic Aneurysm Screening Never done ---    Hemoglobin A1c (Prediabetes) 08/29/2023 8/29/2022    Lipid Panel 08/29/2023 8/29/2022        Orders Placed This Encounter   Procedures    Ambulatory referral/consult to Pulmonology       The following information is provided to all patients.  This information is to help you find resources for any of the problems found today that may be affecting your health:                Living healthy guide: www.[facility].[place].gov      Understanding Diabetes: www.diabetes.org      Eating healthy: www.cdc.gov/healthyweight      [facility] home safety checklist: www.cdc.gov/steadi/patient.html      Agency on Aging: www.goea.[place].[facility]      Alcoholics anonymous (AA): www.aa.org      Physical Activity: www.[place].nih.gov/st8auke      Tobacco use: www.quitwithusla.org

## 2023-04-27 NOTE — ASSESSMENT & PLAN NOTE
BP Readings from Last 3 Encounters:   04/27/23 126/62   03/06/23 (!) 126/58   01/30/23 (!) 126/59   Controlled. Monitor.

## 2023-04-27 NOTE — PROGRESS NOTES
"  Rigoberto Perez presented for a follow-up Medicare AWV today. The following components were reviewed and updated:    Medical history  Family History  Social history  Allergies and Current Medications  Health Risk Assessment  Health Maintenance  Care Team    **See Completed Assessments for Annual Wellness visit with in the encounter summary    The following assessments were completed:  Depression Screening  Cognitive function Screening  Timed Get Up Test  Whisper Test  Nutrition Screening  PAQ      Vitals:    04/27/23 0853   BP: 126/62   BP Location: Right arm   Patient Position: Sitting   BP Method: Medium (Manual)   Pulse: 73   Resp: 16   Temp: 97.5 °F (36.4 °C)   TempSrc: Oral   SpO2: (!) 90%   Weight: 50.8 kg (112 lb)   Height: 5' 11" (1.803 m)     Body mass index is 15.62 kg/m².         Review of Systems   Constitutional:  Negative for activity change, appetite change and fatigue.   HENT:  Negative for rhinorrhea, sore throat and tinnitus.    Respiratory:  Negative for chest tightness and shortness of breath.    Cardiovascular:  Negative for chest pain and leg swelling.   Gastrointestinal:  Negative for abdominal pain, blood in stool, change in bowel habit, nausea, reflux and change in bowel habit.   Genitourinary:  Negative for difficulty urinating and dysuria.   Musculoskeletal:  Negative for arthralgias and gait problem.   Neurological:  Negative for dizziness, coordination difficulties and coordination difficulties.    Physical Exam  Constitutional:       General: He is not in acute distress.     Comments: Thin, frail elderly male   HENT:      Right Ear: Tympanic membrane normal. There is no impacted cerumen.      Left Ear: Tympanic membrane normal. There is no impacted cerumen.      Mouth/Throat:      Mouth: Mucous membranes are moist.      Pharynx: No oropharyngeal exudate or posterior oropharyngeal erythema.   Eyes:      General: No scleral icterus.  Cardiovascular:      Rate and Rhythm: Normal rate " and regular rhythm.   Pulmonary:      Effort: No respiratory distress.      Breath sounds: Normal breath sounds.   Abdominal:      General: There is no distension.      Palpations: Abdomen is soft.      Tenderness: There is no abdominal tenderness.   Musculoskeletal:         General: No swelling.      Cervical back: No tenderness.   Lymphadenopathy:      Cervical: No cervical adenopathy.   Skin:     General: Skin is warm.   Neurological:      Mental Status: He is alert and oriented to person, place, and time. Mental status is at baseline.      Gait: Gait normal.   Psychiatric:         Mood and Affect: Mood normal.         Behavior: Behavior normal.          Health Maintenance         Date Due Completion Date    TETANUS VACCINE Never done ---    Shingles Vaccine (1 of 2) Never done ---    Hemoglobin A1c (Prediabetes) 08/29/2023 8/29/2022    Lipid Panel 08/29/2023 8/29/2022            Diagnoses and health risks identified today and associated recommendations/orders:  Alcohol use disorder, moderate, in sustained remission  Remains in remission.    Bilateral hearing loss  Does not want hearing eval, appliance at this time.     Chronic obstructive pulmonary disease  Intermittent dyspnea.   Uses spouse's O2 to relieve sx.  Referral to pulmonology.    Congestive heart failure  No acute sx. Monitor.       Hyperlipidemia  Lab Results   Component Value Date    LDLCALC 119.4 08/29/2022   Continue rosuvastatin.      Renovascular hypertension  BP Readings from Last 3 Encounters:   04/27/23 126/62   03/06/23 (!) 126/58   01/30/23 (!) 126/59   Controlled. Monitor.       Thrombocytopenia  Lab Results   Component Value Date     08/29/2022     Repeat CBC    Anemia due to stage 3a chronic kidney disease  Difficult , need repeat lab. Renal dose medication, control BP.    Underweight  Weight is stable, gaining per pt.   Not interested in colonoscopy.     Thyroid nodule  Check TSH, free T4 next lab.        Problem List Items  Addressed This Visit          Psychiatric    Alcohol use disorder, moderate, in sustained remission     Remains in remission.              Ophtho    Vitreous hemorrhage of left eye       ENT    Bilateral hearing loss     Does not want hearing eval, appliance at this time.               Pulmonary    Chronic obstructive pulmonary disease - Primary     Intermittent dyspnea.   Uses spouse's O2 to relieve sx.  Referral to pulmonology.           Relevant Orders    Ambulatory referral/consult to Pulmonology       Cardiac/Vascular    Renovascular hypertension     BP Readings from Last 3 Encounters:   04/27/23 126/62   03/06/23 (!) 126/58   01/30/23 (!) 126/59   Controlled. Monitor.            Hyperlipidemia     Lab Results   Component Value Date    LDLCALC 119.4 08/29/2022   Continue rosuvastatin.           Congestive heart failure     No acute sx. Monitor.                 Hematology    Thrombocytopenia     Lab Results   Component Value Date     08/29/2022   Repeat CBC            Oncology    Anemia due to stage 3a chronic kidney disease (Chronic)     Difficult , need repeat lab. Renal dose medication, control BP.              Endocrine    Underweight     Weight is stable, gaining per pt.   Not interested in colonoscopy.            Thyroid nodule     Check TSH, free T4 next lab.             Other Visit Diagnoses       Dependence on supplemental oxygen        Encounter for preventive health examination                Advance Care Planning     Date: 04/27/2023  Discussed ACP with pt. He does not have a living will or HC POA. He would like his spouse to make HC decisions for him if he were incapacitated. He has one child. He is not certain who he wants as secondary decision maker but will consider. He will discuss with his spouse whether he would like tube feeding or ventilator support in the event of serious illness.            Provided Rigoberto with a 5-10 year written screening schedule and personal prevention plan.  Recommendations were developed using the USPSTF age appropriate recommendations. Education, counseling, and referrals were provided as needed.  After Visit Summary printed and given to patient which includes a list of additional screenings\tests needed.    Follow up if symptoms worsen or fail to improve.      DEYSI Mcgee FNP-CI offered to discuss advanced care planning, including how to pick a person who would make decisions for you if you were unable to make them for yourself, called a health care power of , and what kind of decisions you might make such as use of life sustaining treatments such as ventilators and tube feeding when faced with a life limiting illness recorded on a living will that they will need to know. (How you want to be cared for as you near the end of your natural life)     X Patient is interested in learning more about how to make advanced directives.  I provided them paperwork and offered to discuss this with them.

## 2023-05-22 ENCOUNTER — OFFICE VISIT (OUTPATIENT)
Dept: PULMONOLOGY | Facility: CLINIC | Age: 83
End: 2023-05-22
Payer: MEDICARE

## 2023-05-22 VITALS
DIASTOLIC BLOOD PRESSURE: 60 MMHG | HEIGHT: 70 IN | WEIGHT: 106.06 LBS | HEART RATE: 70 BPM | RESPIRATION RATE: 20 BRPM | SYSTOLIC BLOOD PRESSURE: 124 MMHG | BODY MASS INDEX: 15.18 KG/M2 | OXYGEN SATURATION: 98 %

## 2023-05-22 DIAGNOSIS — J44.9 CHRONIC OBSTRUCTIVE PULMONARY DISEASE, UNSPECIFIED COPD TYPE: ICD-10-CM

## 2023-05-22 DIAGNOSIS — F17.218 NICOTINE DEPENDENCE, CIGARETTES, WITH OTHER NICOTINE-INDUCED DISORDERS: ICD-10-CM

## 2023-05-22 DIAGNOSIS — J43.2 CENTRILOBULAR EMPHYSEMA: Primary | ICD-10-CM

## 2023-05-22 PROCEDURE — 3074F PR MOST RECENT SYSTOLIC BLOOD PRESSURE < 130 MM HG: ICD-10-PCS | Mod: CPTII,S$GLB,, | Performed by: HOSPITALIST

## 2023-05-22 PROCEDURE — 3288F PR FALLS RISK ASSESSMENT DOCUMENTED: ICD-10-PCS | Mod: CPTII,S$GLB,, | Performed by: HOSPITALIST

## 2023-05-22 PROCEDURE — 3288F FALL RISK ASSESSMENT DOCD: CPT | Mod: CPTII,S$GLB,, | Performed by: HOSPITALIST

## 2023-05-22 PROCEDURE — 1159F MED LIST DOCD IN RCRD: CPT | Mod: CPTII,S$GLB,, | Performed by: HOSPITALIST

## 2023-05-22 PROCEDURE — 1160F PR REVIEW ALL MEDS BY PRESCRIBER/CLIN PHARMACIST DOCUMENTED: ICD-10-PCS | Mod: CPTII,S$GLB,, | Performed by: HOSPITALIST

## 2023-05-22 PROCEDURE — 1101F PR PT FALLS ASSESS DOC 0-1 FALLS W/OUT INJ PAST YR: ICD-10-PCS | Mod: CPTII,S$GLB,, | Performed by: HOSPITALIST

## 2023-05-22 PROCEDURE — 3078F PR MOST RECENT DIASTOLIC BLOOD PRESSURE < 80 MM HG: ICD-10-PCS | Mod: CPTII,S$GLB,, | Performed by: HOSPITALIST

## 2023-05-22 PROCEDURE — 3074F SYST BP LT 130 MM HG: CPT | Mod: CPTII,S$GLB,, | Performed by: HOSPITALIST

## 2023-05-22 PROCEDURE — 99204 OFFICE O/P NEW MOD 45 MIN: CPT | Mod: S$GLB,,, | Performed by: HOSPITALIST

## 2023-05-22 PROCEDURE — 1159F PR MEDICATION LIST DOCUMENTED IN MEDICAL RECORD: ICD-10-PCS | Mod: CPTII,S$GLB,, | Performed by: HOSPITALIST

## 2023-05-22 PROCEDURE — 99999 PR PBB SHADOW E&M-EST. PATIENT-LVL IV: CPT | Mod: PBBFAC,,, | Performed by: HOSPITALIST

## 2023-05-22 PROCEDURE — 1160F RVW MEDS BY RX/DR IN RCRD: CPT | Mod: CPTII,S$GLB,, | Performed by: HOSPITALIST

## 2023-05-22 PROCEDURE — 99999 PR PBB SHADOW E&M-EST. PATIENT-LVL IV: ICD-10-PCS | Mod: PBBFAC,,, | Performed by: HOSPITALIST

## 2023-05-22 PROCEDURE — 1101F PT FALLS ASSESS-DOCD LE1/YR: CPT | Mod: CPTII,S$GLB,, | Performed by: HOSPITALIST

## 2023-05-22 PROCEDURE — 3078F DIAST BP <80 MM HG: CPT | Mod: CPTII,S$GLB,, | Performed by: HOSPITALIST

## 2023-05-22 PROCEDURE — 99214 OFFICE O/P EST MOD 30 MIN: CPT | Performed by: HOSPITALIST

## 2023-05-22 PROCEDURE — 1126F PR PAIN SEVERITY QUANTIFIED, NO PAIN PRESENT: ICD-10-PCS | Mod: CPTII,S$GLB,, | Performed by: HOSPITALIST

## 2023-05-22 PROCEDURE — 99204 PR OFFICE/OUTPT VISIT, NEW, LEVL IV, 45-59 MIN: ICD-10-PCS | Mod: S$GLB,,, | Performed by: HOSPITALIST

## 2023-05-22 PROCEDURE — 1126F AMNT PAIN NOTED NONE PRSNT: CPT | Mod: CPTII,S$GLB,, | Performed by: HOSPITALIST

## 2023-05-22 NOTE — ASSESSMENT & PLAN NOTE
- very strong smoking history, current smoker  - out of guidelines for LDCT, did have CTA 2021 that did not comment on any nodules/masses in report (in careeverywhere, images not viewable)  - continue Trelegy, albuterol as needed  - further evaluation with walk and PFT  - not in exacerbation

## 2023-05-22 NOTE — PROGRESS NOTES
Subjective:      Patient ID: Rigoberto Perez is a 82 y.o. male.    Chief Complaint: Emphysema    82 year old male with history of CVA, emphysema, PVD, CHF, CKD3, nicotine dependence who presents to Pulmonary clinic for evaluation of COPD. History provided by patient and his spouse. He has been treated for COPD by his PCP with Trelegy and albuterol as needed. He occasionally uses his wife's nebulizer for treatments. Denies admissions or outpatient treatment with abx/steroids for respiratory issues in the past 5 years. Denies night sweats or decreased appetite. No cough.     Pertinent Work Up:  CXR 6/2022- Hyperexpansion with flattened diaphragms, small right effusion  CTA Chest 2/2021- (care everywhere, not able to view image) No nodules noted. Paraseptal and centrilobar emphysema  ECHO 2/2021- EF 60%, mild TR    Pulmonary Interventions:   Trelegy- Once per day  Duoneb- using wife's machine, last used last week  Albuterol- been several weeks since had to use    Smoking hx: Current smoker, .5ppd, started smoking at age of 11, was a much heavier smoker at one time, up to 3 packs/day  Occupational hx: [employer]    Review of Systems   Respiratory:  Positive for dyspnea on extertion.    Objective:     Physical Exam   Constitutional: He is oriented to person, place, and time.   Cachectic male, in no distress   Cardiovascular: Normal rate and regular rhythm.   Pulmonary/Chest:   Exam limited by body habitus (cachectic), no overt wheezing appreciated   Musculoskeletal:         General: No edema.   Neurological: He is alert and oriented to person, place, and time.   Psychiatric: He has a normal mood and affect.   Personal Diagnostic Review  As above  No flowsheet data found.     Assessment:     No diagnosis found.     Outpatient Encounter Medications as of 5/22/2023   Medication Sig Dispense Refill    albuterol (PROAIR HFA) 90 mcg/actuation inhaler Inhale 2 puffs into the lungs every 6 (six) hours as needed for Wheezing.  Rescue 18 g 1    amLODIPine (NORVASC) 5 MG tablet Take 1 tablet (5 mg total) by mouth once daily. 30 tablet 11    aspirin (ECOTRIN) 81 MG EC tablet Take 1 tablet (81 mg total) by mouth once daily. 30 tablet 5    azelastine (ASTELIN) 137 mcg (0.1 %) nasal spray 1 spray (137 mcg total) by Nasal route 2 (two) times daily as needed for Rhinitis (sneezing). 30 mL 11    blood pressure monitor Kit 1 kit by Misc.(Non-Drug; Combo Route) route once daily. (Patient not taking: Reported on 1/30/2023) 1 each 0    clotrimazole (LOTRIMIN) 1 % cream Apply topically 2 (two) times daily. 24 g 1    cyanocobalamin 500 MCG tablet Take 1 tablet (500 mcg total) by mouth once daily. 90 tablet 1    nebulizer and compressor Ivon Use as directed      rosuvastatin (CRESTOR) 10 MG tablet Take 1 tablet (10 mg total) by mouth once daily. 30 tablet 5    TRELEGY ELLIPTA 100-62.5-25 mcg DsDv Inhale 1 puff into the lungs once daily. 30 each 5     No facility-administered encounter medications on file as of 5/22/2023.     No orders of the defined types were placed in this encounter.        Plan:     Problem List Items Addressed This Visit          Pulmonary    Chronic obstructive pulmonary disease    Relevant Orders    Stress test, pulmonary    Centrilobular emphysema - Primary     - very strong smoking history, current smoker  - out of guidelines for LDCT, did have CTA 2021 that did not comment on any nodules/masses in report (in careeverywhere, images not viewable)  - continue Trelegy, albuterol as needed  - further evaluation with walk and PFT  - not in exacerbation           Relevant Orders    Stress test, pulmonary       Other    Nicotine dependence, cigarettes, with other nicotine-induced disorders     - down to .5ppd, not interested in quitting            Plan discussed with patient and his wife, they expressed understanding. Will see him back in 2 weeks to review testing. Thank you for the courtesy of referring Mr. Perez for evaluation.

## 2023-06-06 ENCOUNTER — OFFICE VISIT (OUTPATIENT)
Dept: PRIMARY CARE CLINIC | Facility: CLINIC | Age: 83
End: 2023-06-06
Payer: MEDICARE

## 2023-06-06 VITALS
TEMPERATURE: 98 F | DIASTOLIC BLOOD PRESSURE: 72 MMHG | SYSTOLIC BLOOD PRESSURE: 118 MMHG | WEIGHT: 110.38 LBS | BODY MASS INDEX: 15.8 KG/M2 | HEART RATE: 65 BPM | OXYGEN SATURATION: 100 % | HEIGHT: 70 IN

## 2023-06-06 DIAGNOSIS — F17.218 NICOTINE DEPENDENCE, CIGARETTES, WITH OTHER NICOTINE-INDUCED DISORDERS: ICD-10-CM

## 2023-06-06 DIAGNOSIS — I15.0 RENOVASCULAR HYPERTENSION: ICD-10-CM

## 2023-06-06 DIAGNOSIS — D51.8 VITAMIN B12 DEFICIENCY (DIETARY) ANEMIA: Chronic | ICD-10-CM

## 2023-06-06 DIAGNOSIS — D64.9 ANEMIA, UNSPECIFIED TYPE: Primary | ICD-10-CM

## 2023-06-06 DIAGNOSIS — E78.5 HYPERLIPIDEMIA, UNSPECIFIED HYPERLIPIDEMIA TYPE: ICD-10-CM

## 2023-06-06 DIAGNOSIS — B35.1 ONYCHOMYCOSIS: ICD-10-CM

## 2023-06-06 DIAGNOSIS — N18.32 STAGE 3B CHRONIC KIDNEY DISEASE: ICD-10-CM

## 2023-06-06 DIAGNOSIS — R73.03 PREDIABETES: ICD-10-CM

## 2023-06-06 DIAGNOSIS — E53.8 FOLATE DEFICIENCY: ICD-10-CM

## 2023-06-06 LAB
ALBUMIN SERPL BCP-MCNC: 4.5 G/DL (ref 3.5–5.2)
ALP SERPL-CCNC: 113 U/L (ref 55–135)
ALT SERPL W/O P-5'-P-CCNC: 14 U/L (ref 10–44)
ANION GAP SERPL CALC-SCNC: 13 MMOL/L (ref 8–16)
AST SERPL-CCNC: 24 U/L (ref 10–40)
BILIRUB SERPL-MCNC: 0.4 MG/DL (ref 0.1–1)
BUN SERPL-MCNC: 21 MG/DL (ref 8–23)
CALCIUM SERPL-MCNC: 10.4 MG/DL (ref 8.7–10.5)
CHLORIDE SERPL-SCNC: 105 MMOL/L (ref 95–110)
CHOLEST SERPL-MCNC: 129 MG/DL (ref 120–199)
CHOLEST/HDLC SERPL: 2.4 {RATIO} (ref 2–5)
CO2 SERPL-SCNC: 23 MMOL/L (ref 23–29)
CREAT SERPL-MCNC: 1.3 MG/DL (ref 0.5–1.4)
EST. GFR  (NO RACE VARIABLE): 54.5 ML/MIN/1.73 M^2
FERRITIN SERPL-MCNC: 101 NG/ML (ref 20–300)
GLUCOSE SERPL-MCNC: 88 MG/DL (ref 70–110)
HDLC SERPL-MCNC: 53 MG/DL (ref 40–75)
HDLC SERPL: 41.1 % (ref 20–50)
IRON SERPL-MCNC: 73 UG/DL (ref 45–160)
LDLC SERPL CALC-MCNC: 64.8 MG/DL (ref 63–159)
NONHDLC SERPL-MCNC: 76 MG/DL
POTASSIUM SERPL-SCNC: 5.1 MMOL/L (ref 3.5–5.1)
PROT SERPL-MCNC: 8.3 G/DL (ref 6–8.4)
SATURATED IRON: 22 % (ref 20–50)
SODIUM SERPL-SCNC: 141 MMOL/L (ref 136–145)
TOTAL IRON BINDING CAPACITY: 339 UG/DL (ref 250–450)
TRANSFERRIN SERPL-MCNC: 229 MG/DL (ref 200–375)
TRIGL SERPL-MCNC: 56 MG/DL (ref 30–150)

## 2023-06-06 PROCEDURE — 3078F PR MOST RECENT DIASTOLIC BLOOD PRESSURE < 80 MM HG: ICD-10-PCS | Mod: CPTII,S$GLB,, | Performed by: INTERNAL MEDICINE

## 2023-06-06 PROCEDURE — 85025 COMPLETE CBC W/AUTO DIFF WBC: CPT | Performed by: INTERNAL MEDICINE

## 2023-06-06 PROCEDURE — 1126F PR PAIN SEVERITY QUANTIFIED, NO PAIN PRESENT: ICD-10-PCS | Mod: CPTII,S$GLB,, | Performed by: INTERNAL MEDICINE

## 2023-06-06 PROCEDURE — 3074F PR MOST RECENT SYSTOLIC BLOOD PRESSURE < 130 MM HG: ICD-10-PCS | Mod: CPTII,S$GLB,, | Performed by: INTERNAL MEDICINE

## 2023-06-06 PROCEDURE — 84466 ASSAY OF TRANSFERRIN: CPT | Performed by: INTERNAL MEDICINE

## 2023-06-06 PROCEDURE — 99999 PR PBB SHADOW E&M-EST. PATIENT-LVL V: CPT | Mod: PBBFAC,,, | Performed by: INTERNAL MEDICINE

## 2023-06-06 PROCEDURE — 1101F PR PT FALLS ASSESS DOC 0-1 FALLS W/OUT INJ PAST YR: ICD-10-PCS | Mod: CPTII,S$GLB,, | Performed by: INTERNAL MEDICINE

## 2023-06-06 PROCEDURE — 83036 HEMOGLOBIN GLYCOSYLATED A1C: CPT | Performed by: INTERNAL MEDICINE

## 2023-06-06 PROCEDURE — 99999 PR PBB SHADOW E&M-EST. PATIENT-LVL V: ICD-10-PCS | Mod: PBBFAC,,, | Performed by: INTERNAL MEDICINE

## 2023-06-06 PROCEDURE — 3078F DIAST BP <80 MM HG: CPT | Mod: CPTII,S$GLB,, | Performed by: INTERNAL MEDICINE

## 2023-06-06 PROCEDURE — 99417 PROLNG OP E/M EACH 15 MIN: CPT | Mod: S$GLB,,, | Performed by: INTERNAL MEDICINE

## 2023-06-06 PROCEDURE — 82607 VITAMIN B-12: CPT | Performed by: INTERNAL MEDICINE

## 2023-06-06 PROCEDURE — 99215 PR OFFICE/OUTPT VISIT, EST, LEVL V, 40-54 MIN: ICD-10-PCS | Mod: S$GLB,,, | Performed by: INTERNAL MEDICINE

## 2023-06-06 PROCEDURE — 82306 VITAMIN D 25 HYDROXY: CPT | Performed by: INTERNAL MEDICINE

## 2023-06-06 PROCEDURE — 99215 OFFICE O/P EST HI 40 MIN: CPT | Mod: S$GLB,,, | Performed by: INTERNAL MEDICINE

## 2023-06-06 PROCEDURE — 3288F FALL RISK ASSESSMENT DOCD: CPT | Mod: CPTII,S$GLB,, | Performed by: INTERNAL MEDICINE

## 2023-06-06 PROCEDURE — 3288F PR FALLS RISK ASSESSMENT DOCUMENTED: ICD-10-PCS | Mod: CPTII,S$GLB,, | Performed by: INTERNAL MEDICINE

## 2023-06-06 PROCEDURE — 82746 ASSAY OF FOLIC ACID SERUM: CPT | Performed by: INTERNAL MEDICINE

## 2023-06-06 PROCEDURE — 99417 PR PROLONGED SVC, OUTPT, W/WO DIRECT PT CONTACT,  EA ADDTL 15 MIN: ICD-10-PCS | Mod: S$GLB,,, | Performed by: INTERNAL MEDICINE

## 2023-06-06 PROCEDURE — 1101F PT FALLS ASSESS-DOCD LE1/YR: CPT | Mod: CPTII,S$GLB,, | Performed by: INTERNAL MEDICINE

## 2023-06-06 PROCEDURE — 83970 ASSAY OF PARATHORMONE: CPT | Performed by: INTERNAL MEDICINE

## 2023-06-06 PROCEDURE — 82728 ASSAY OF FERRITIN: CPT | Performed by: INTERNAL MEDICINE

## 2023-06-06 PROCEDURE — 80053 COMPREHEN METABOLIC PANEL: CPT | Performed by: INTERNAL MEDICINE

## 2023-06-06 PROCEDURE — 80061 LIPID PANEL: CPT | Performed by: INTERNAL MEDICINE

## 2023-06-06 PROCEDURE — 3074F SYST BP LT 130 MM HG: CPT | Mod: CPTII,S$GLB,, | Performed by: INTERNAL MEDICINE

## 2023-06-06 PROCEDURE — 1126F AMNT PAIN NOTED NONE PRSNT: CPT | Mod: CPTII,S$GLB,, | Performed by: INTERNAL MEDICINE

## 2023-06-06 NOTE — PROGRESS NOTES
Rigoberto Perez  06/06/2023  [id number]    Trinidad Bai MD  Patient Care Team:  Trinidad Bai MD as PCP - General (Internal Medicine)  Dwight Rubio MD as Consulting Physician (Otolaryngology)  Joan Cadena NP as Nurse Practitioner (Family Medicine)  Alice Ruiz NP as Nurse Practitioner (Family Medicine)    Visit Type: Follow-up    Chief Complaint:  Chief Complaint   Patient presents with    3 mth f/u      History of Present Illness: Mr. Rigoberto Perez is an 82 year old male here for scheduled f/u.    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     Chronic exertional dyspnea. Chronically underweight.   Still smoking - cutting back slowly - down to about half a pack a day    Has BP monitor at home but not checking BP at home    New glasses  Looking better today than on previous visits.  Says he's been feeling better    PHQ-4 Score: 0     Recent appointments:   5/22/23 Tianna Pacheco COPD/emphysema  4/27/23 O65+ Joseph EAWV  3/08/23 Ophthal Cailin   3/06/23 O65+ Joseph   1/30/23 O65+ Joseph   11/30/22 O65+ Joseph   10/28/22 O65+ Abi    Upcoming appointments:  Future Appointments       Date Provider Specialty Appt Notes    6/12/2023 Brooklyn Damon DPM Podiatry Onychomycosis     6/23/2023  Radiology Renovascular hypertension [I15.0]  Nicotine dependence, cigarettes, with other nicotine-induced disorders    6/27/2023  Pulmonology f/u rev pft walk    6/27/2023  Pulmonology f/u rev pft walk    6/27/2023 Violet Pacheco PA-C Pulmonology f/u rev pft walk    9/7/2023 Alice Ruiz NP Primary Care 3 month f/u          The following were reviewed: Active problem list, medication list, allergies, family history, social history, and Health Maintenance.     Medications have been reviewed and reconciled with patient at visit today.    Review of Systems   See HPI  above    Exam: Initial VS /63 P 65 sat 100% RA temp 97.7  Vitals:    06/06/23 1030   BP: 118/72   Pulse:    Temp:      Weight: 50.1 kg (110 lb 6.4 oz)   Body mass index is 15.84 kg/m².    BP Readings from Last 3 Encounters:   06/06/23 118/72   05/22/23 124/60   04/27/23 126/62      Wt Readings from Last 3 Encounters:   06/06/23 0948 50.1 kg (110 lb 6.4 oz)   05/22/23 1059 48.1 kg (106 lb 0.7 oz)   04/27/23 0853 50.8 kg (112 lb)     Physical Exam  Vitals reviewed.   Constitutional:       General: He is not in acute distress.     Appearance: He is ill-appearing.      Comments: underweight   HENT:      Head: Normocephalic and atraumatic.      Right Ear: Tympanic membrane, ear canal and external ear normal.      Left Ear: Tympanic membrane, ear canal and external ear normal.      Nose: Nose normal.      Mouth/Throat:      Mouth: Mucous membranes are moist.      Pharynx: Oropharynx is clear.      Comments: Plate on top  Few teeth remaining on bottom  Eyes:      General: No scleral icterus.     Extraocular Movements: Extraocular movements intact.      Conjunctiva/sclera: Conjunctivae normal.   Neck:      Vascular: No carotid bruit.   Cardiovascular:      Rate and Rhythm: Normal rate and regular rhythm.      Comments: distant  Pulmonary:      Effort: Pulmonary effort is normal.      Breath sounds: Normal breath sounds.   Abdominal:      General: Bowel sounds are normal.      Palpations: Abdomen is soft.      Tenderness: There is no abdominal tenderness.   Musculoskeletal:      Right lower leg: No edema.      Left lower leg: No edema.   Lymphadenopathy:      Cervical: No cervical adenopathy.   Skin:     General: Skin is warm and dry.      Findings: Bruising and lesion present.   Neurological:      General: No focal deficit present.      Mental Status: He is alert. Mental status is at baseline.      Comments: Ambulates independently w/o AD   Psychiatric:         Mood and Affect: Mood normal.         Behavior: Behavior  normal.         Thought Content: Thought content normal.      Laboratory Reviewed  Lab Results   Component Value Date    WBC 7.75 06/06/2023    HGB 11.3 (L) 06/06/2023    HCT 36.2 (L) 06/06/2023     06/06/2023    MCV 98 06/06/2023    CHOL 129 06/06/2023    TRIG 56 06/06/2023    HDL 53 06/06/2023    LDLCALC 64.8 06/06/2023    ALT 14 06/06/2023    AST 24 06/06/2023     06/06/2023    K 5.1 06/06/2023     06/06/2023    CREATININE 1.3 06/06/2023    BUN 21 06/06/2023    CO2 23 06/06/2023    MG 2.0 08/29/2022    TSH 1.404 08/29/2022    FREET4 1.05 08/29/2022    HGBA1C 5.3 06/06/2023     Lab Results   Component Value Date    PTH 75.9 06/06/2023    CALCIUM 10.4 06/06/2023    PHOS 3.8 08/29/2022      Lab Results   Component Value Date    [id number] 478 06/06/2023     Lab Results   Component Value Date    FOLATE 4.7 06/06/2023      Lab Results   Component Value Date    IRON 73 06/06/2023    TRANSFERRIN 229 06/06/2023    TIBC 339 06/06/2023    FESATURATED 22 06/06/2023     Other labs 6/06/23 vit D 24  8/29/22 eGFR 37.1 HCV NR mma 0.43    US Thyroid 8/29/22 Small thyroid with mild diffuse parenchymal heterogeneity, possibly related to chronic sequela of thyroiditis. Multiple small benign-appearing thyroid nodules as above.  No findings meeting TIRADS criteria for biopsy or follow-up.    HILDA 8/29/22 Rt hilda and waveforms suggestive of mild disease.  Left hilda and waveforms suggestive of moderate disease.      Assessment:   82 y.o. male with multiple co-morbid illnesses here for continued follow up of medical problems.      The primary encounter diagnosis was Anemia, unspecified type. Diagnoses of Stage 3b chronic kidney disease, Prediabetes, Hyperlipidemia, unspecified hyperlipidemia type, Vitamin B12 deficiency (dietary) anemia, Folate deficiency, Renovascular hypertension, Nicotine dependence, cigarettes, with other nicotine-induced disorders, and Onychomycosis were also pertinent to this visit.      Plan:   1.  Anemia, unspecified type  -     CBC Auto Differential; Future; Expected date: 06/06/2023  -     Vitamin B12; Future; Expected date: 06/06/2023  -     Folate; Future; Expected date: 06/06/2023  -     Iron and TIBC; Future; Expected date: 06/06/2023  -     Ferritin; Future; Expected date: 06/06/2023    2. Stage 3b chronic kidney disease  -     Comprehensive Metabolic Panel; Future; Expected date: 06/06/2023  -     Vitamin D; Future; Expected date: 06/06/2023  -     PTH, Intact; Future; Expected date: 06/06/2023    3. Prediabetes  -     Hemoglobin A1C; Future; Expected date: 06/06/2023    4. Hyperlipidemia, unspecified hyperlipidemia type  -     Lipid Panel; Future; Expected date: 06/06/2023    5. Vitamin B12 deficiency (dietary) anemia  -     Vitamin B12; Future; Expected date: 06/06/2023    6. Folate deficiency  -     Folate; Future; Expected date: 06/06/2023    7. Renovascular hypertension  -     US AAA Screening; Future; Expected date: 06/06/2023    8. Nicotine dependence, cigarettes, with other nicotine-induced disorders  Assessment & Plan:  Cont encourage smoking cessation    Orders:  -     US AAA Screening; Future; Expected date: 06/06/2023    9. Onychomycosis  -     Ambulatory referral/consult to Podiatry; Future; Expected date: 06/13/2023    Other orders  -     cholecalciferol, vitamin D3, (VITAMIN D3) 25 mcg (1,000 unit) capsule; Take 2 capsules (2,000 Units total) by mouth once daily.  Dispense: 180 capsule; Refill: 1  -     folic acid (FOLVITE) 1 MG tablet; Take 1 tablet (1 mg total) by mouth once daily.  Dispense: 90 tablet; Refill: 1         Health Maintenance         Date Due Completion Date    TETANUS VACCINE Never done ---    Shingles Vaccine (1 of 2) Never done ---    COVID-19 Vaccine (4 - Pfizer series) 04/08/2023 12/8/2022    Hemoglobin A1c (Prediabetes) 08/29/2023 8/29/2022    Lipid Panel 08/29/2023 8/29/2022            -Patient's lab results were reviewed and discussed with patient  -Treatment  options and alternatives were discussed with the patient. Patient expressed understanding. Patient was given the opportunity to ask questions and be an active participant in their medical care. Patient had no further questions or concerns at this time.   -Patient is an overall moderate to HIGH risk for health complications from their medical conditions.     Follow up: Follow up in about 3 months (around 9/6/2023) for Follow Up.    Care Plan/Goals: Reviewed Yes   Goals         Gain weight (pt-stated)       Achievable - within patient's control: yes  Maybe    Difficulties Identified: chronic medical conditions    Plan for Overcoming difficulties: nutritional supplements    Timeframe for completion: 6 mos    Update 10/28/22: 3 lbs weight gain - has cut back smoking                      After visit summary printed and given to patient upon discharge.  Patient goals and care plan are included in After visit summary.    TOTAL TIME evaluating and managing this patient for this encounter was greater than 70 minutes. This time was spent personally by me on some of the following activities: review of patient's past medical history, assessing age-appropriate health maintenance needs, review of any interval history, review and interpretation of lab results, review and interpretation of imaging test results, review and interpretation of cardiology test results, reviewing consulting specialist notes, obtaining history from the patient and family, examination of the patient, medication reconciliation, managing and/or ordering prescription medications, ordering imaging tests, ordering referral to subspecialty provider(s), educating patient and answering their questions about diagnosis, treatment plan, and goals of treatment, discussing planned follow-up and final documentation of the visit. This time was exclusive of any separately billable procedures for this patient and exclusive of time spent treating any other patients.

## 2023-06-06 NOTE — PATIENT INSTRUCTIONS
Recommend tetanus booster, shingles vaccine and CoVid Bivalent - can schedule at Pharmacy of choice    Let us know if would like to see dermatologist    Cont work on cutting back smoking!

## 2023-06-07 PROBLEM — D64.9 ANEMIA: Status: ACTIVE | Noted: 2023-06-07

## 2023-06-07 PROBLEM — E53.8 FOLATE DEFICIENCY: Status: ACTIVE | Noted: 2023-06-07

## 2023-06-07 LAB
25(OH)D3+25(OH)D2 SERPL-MCNC: 24 NG/ML (ref 30–96)
BASOPHILS # BLD AUTO: 0.05 K/UL (ref 0–0.2)
BASOPHILS NFR BLD: 0.6 % (ref 0–1.9)
DIFFERENTIAL METHOD: ABNORMAL
EOSINOPHIL # BLD AUTO: 0.3 K/UL (ref 0–0.5)
EOSINOPHIL NFR BLD: 3.4 % (ref 0–8)
ERYTHROCYTE [DISTWIDTH] IN BLOOD BY AUTOMATED COUNT: 13.6 % (ref 11.5–14.5)
ESTIMATED AVG GLUCOSE: 105 MG/DL (ref 68–131)
FOLATE SERPL-MCNC: 4.7 NG/ML (ref 4–24)
HBA1C MFR BLD: 5.3 % (ref 4–5.6)
HCT VFR BLD AUTO: 36.2 % (ref 40–54)
HGB BLD-MCNC: 11.3 G/DL (ref 14–18)
IMM GRANULOCYTES # BLD AUTO: 0.04 K/UL (ref 0–0.04)
IMM GRANULOCYTES NFR BLD AUTO: 0.5 % (ref 0–0.5)
LYMPHOCYTES # BLD AUTO: 1 K/UL (ref 1–4.8)
LYMPHOCYTES NFR BLD: 12.5 % (ref 18–48)
MCH RBC QN AUTO: 30.7 PG (ref 27–31)
MCHC RBC AUTO-ENTMCNC: 31.2 G/DL (ref 32–36)
MCV RBC AUTO: 98 FL (ref 82–98)
MONOCYTES # BLD AUTO: 0.5 K/UL (ref 0.3–1)
MONOCYTES NFR BLD: 5.9 % (ref 4–15)
NEUTROPHILS # BLD AUTO: 6 K/UL (ref 1.8–7.7)
NEUTROPHILS NFR BLD: 77.1 % (ref 38–73)
NRBC BLD-RTO: 0 /100 WBC
PLATELET # BLD AUTO: 159 K/UL (ref 150–450)
PMV BLD AUTO: 11.3 FL (ref 9.2–12.9)
PTH-INTACT SERPL-MCNC: 75.9 PG/ML (ref 9–77)
RBC # BLD AUTO: 3.68 M/UL (ref 4.6–6.2)
VIT B12 SERPL-MCNC: 478 PG/ML (ref 210–950)
WBC # BLD AUTO: 7.75 K/UL (ref 3.9–12.7)

## 2023-06-07 RX ORDER — FOLIC ACID 1 MG/1
1 TABLET ORAL DAILY
Qty: 90 TABLET | Refills: 1 | Status: SHIPPED | OUTPATIENT
Start: 2023-06-07 | End: 2023-12-04

## 2023-06-07 RX ORDER — VIT C/E/ZN/COPPR/LUTEIN/ZEAXAN 250MG-90MG
2000 CAPSULE ORAL DAILY
Qty: 180 CAPSULE | Refills: 1 | Status: SHIPPED | OUTPATIENT
Start: 2023-06-07 | End: 2023-12-04

## 2023-06-07 NOTE — PROGRESS NOTES
Pt does not use [facility] pt portal - please call w message below:    Please let Mr. Perez know that his labs look pretty good for the most part. His vitamin D and folate levels are low. I sent prescriptions for vitamin D and folic acid supplements but if insurance doesn't cover they can get over-the-counter. Recommend D3 2,000 iu daily and folic acid 1 mg daily (see if Mr. Perez is ok w you discussing this also w  if he's not sure he'll remember exact details)   Thank you!

## 2023-06-12 ENCOUNTER — OFFICE VISIT (OUTPATIENT)
Dept: PODIATRY | Facility: CLINIC | Age: 83
End: 2023-06-12
Payer: MEDICARE

## 2023-06-12 DIAGNOSIS — I73.9 PERIPHERAL VASCULAR DISEASE: Primary | ICD-10-CM

## 2023-06-12 DIAGNOSIS — B35.1 ONYCHOMYCOSIS: ICD-10-CM

## 2023-06-12 DIAGNOSIS — N18.32 STAGE 3B CHRONIC KIDNEY DISEASE: ICD-10-CM

## 2023-06-12 PROCEDURE — 11721 DEBRIDE NAIL 6 OR MORE: CPT | Mod: Q9,S$GLB,, | Performed by: PODIATRIST

## 2023-06-12 PROCEDURE — 1160F PR REVIEW ALL MEDS BY PRESCRIBER/CLIN PHARMACIST DOCUMENTED: ICD-10-PCS | Mod: CPTII,S$GLB,, | Performed by: PODIATRIST

## 2023-06-12 PROCEDURE — 99999 PR PBB SHADOW E&M-EST. PATIENT-LVL II: CPT | Mod: PBBFAC,,, | Performed by: PODIATRIST

## 2023-06-12 PROCEDURE — 99203 PR OFFICE/OUTPT VISIT, NEW, LEVL III, 30-44 MIN: ICD-10-PCS | Mod: 25,S$GLB,, | Performed by: PODIATRIST

## 2023-06-12 PROCEDURE — 99203 OFFICE O/P NEW LOW 30 MIN: CPT | Mod: 25,S$GLB,, | Performed by: PODIATRIST

## 2023-06-12 PROCEDURE — 99999 PR PBB SHADOW E&M-EST. PATIENT-LVL II: ICD-10-PCS | Mod: PBBFAC,,, | Performed by: PODIATRIST

## 2023-06-12 PROCEDURE — 1101F PT FALLS ASSESS-DOCD LE1/YR: CPT | Mod: CPTII,S$GLB,, | Performed by: PODIATRIST

## 2023-06-12 PROCEDURE — 1101F PR PT FALLS ASSESS DOC 0-1 FALLS W/OUT INJ PAST YR: ICD-10-PCS | Mod: CPTII,S$GLB,, | Performed by: PODIATRIST

## 2023-06-12 PROCEDURE — 3288F FALL RISK ASSESSMENT DOCD: CPT | Mod: CPTII,S$GLB,, | Performed by: PODIATRIST

## 2023-06-12 PROCEDURE — 11721 PR DEBRIDEMENT OF NAILS, 6 OR MORE: ICD-10-PCS | Mod: Q9,S$GLB,, | Performed by: PODIATRIST

## 2023-06-12 PROCEDURE — 1159F MED LIST DOCD IN RCRD: CPT | Mod: CPTII,S$GLB,, | Performed by: PODIATRIST

## 2023-06-12 PROCEDURE — 3288F PR FALLS RISK ASSESSMENT DOCUMENTED: ICD-10-PCS | Mod: CPTII,S$GLB,, | Performed by: PODIATRIST

## 2023-06-12 PROCEDURE — 1159F PR MEDICATION LIST DOCUMENTED IN MEDICAL RECORD: ICD-10-PCS | Mod: CPTII,S$GLB,, | Performed by: PODIATRIST

## 2023-06-12 PROCEDURE — 1160F RVW MEDS BY RX/DR IN RCRD: CPT | Mod: CPTII,S$GLB,, | Performed by: PODIATRIST

## 2023-06-12 NOTE — PROGRESS NOTES
Subjective:       Patient ID: Rigoberto Perez is a 82 y.o. male.    Chief Complaint: Nail Problem (Patient present with long thick yellow nails. He denies pain at present, non diabetic and not taking LTAT. )      HPI:  Patient presents to the office this afternoon, with complaint of elongated and thickened nail plates to the left and right.  Patient states using over-the-counter topical medications which have not been helpful curative.  The patient states slight discomfort due to the nail thickening and/or elongation. This patient's PMD is Trinidad Bai MD. The patient states that the the nail plate/nails have appeared such for the past several months to years. Denies recent trauma which could lead to the diagnoses of nail dystrophy.     Review of patient's allergies indicates:  No Known Allergies    Past Medical History:   Diagnosis Date    Carotid artery disease      8/30/17 shows 50% stenosis    Cataract 2013    [facility] [place]    COPD (chronic obstructive pulmonary disease)     History of basal cell cancer        Family History   Problem Relation Age of Onset    Diabetes Maternal Grandmother        Social History     Socioeconomic History    Marital status:    Tobacco Use    Smoking status: Every Day     Packs/day: 0.75     Years: 70.00     Pack years: 52.50     Types: Cigarettes    Smokeless tobacco: Never   Substance and Sexual Activity    Alcohol use: No    Drug use: No    Sexual activity: Never       Past Surgical History:   Procedure Laterality Date    CATARACT EXTRACTION      CATARACT EXTRACTION, BILATERAL Bilateral 2013    [facility] [place]       Review of Systems       Objective:   There were no vitals taken for this visit.    Ankle Brachial Indices (HILDA)  · Rt abia dn waveforms suggestive of mild disease.  · Left hilda and waveforms suggestive of moderate disease.     US Soft Tissue Head Neck Thyroid  Narrative: EXAMINATION:  US SOFT TISSUE HEAD NECK THYROID    CLINICAL  HISTORY:  Nontoxic single thyroid nodule    TECHNIQUE:  Ultrasound of the thyroid and cervical lymph nodes was performed.    COMPARISON:  None.    FINDINGS:  The thyroid gland is small in size.  The right lobe measures 3.5 x 1.1 x 1.3 cm.  The left lobe measures 3.0 x 1.2 x 1.3 cm. Thyroid isthmus measures 2 mm AP.  Total thyroid volume measures approximately 4.8 mL.  Thyroid parenchyma demonstrates mild diffuse heterogeneity with normal vascularity.  Several small cystic and spongiform nodules are seen in both lobes.  Largest nodule measures approximately 11 mm at the left upper pole and is predominately cystic.  No lymphadenopathy is seen.  Impression: 1. Small thyroid with mild diffuse parenchymal heterogeneity, possibly related to chronic sequela of thyroiditis  2. Multiple small benign-appearing thyroid nodules as above.  No findings meeting TIRADS criteria for biopsy or follow-up.    Electronically signed by: Hoang Jackson MD  Date:    08/29/2022  Time:    14:43       Physical Exam    LOWER EXTREMITY PHYSICAL EXAMINATION  NEUROLOGY: Sensation to light touch is intact. Proprioception is intact.     DERMATOLOGY:  Skin is supple, dry and intact. No ulcerations are noted. No hyperkeratosis or calluses are noted. No ecchymosis appreciated.  There are nail changes which are consistent with onychomycosis on the right and left foot.   LEFT:On the left foot, nail #1, #2, #3, #4, and #5 is/are thickened, is/are dystrophic, with yellow discoloration, and with malodorous subungual debris.   RIGHT:On the right foot, nail #1, #2, #3, #4, and #5 is/are thickened, is/are dystrophic, with yellow discoloration, and with malodorous subungual debris.     ORTHOPEDIC: Manual Muscle Testing is 5/5 in all planes on the left and right, without pains, with and without resistance. Gait pattern is non-antalgic.    VASCULAR: The right DP pulse is 2/4 and the left DP is 2/4. The right PT pulse is 2/4 and the left PT pulse is 2/4.  Proximal to distal, warm to warm. No dependent rubor or elevation palor is noted. Capillary refill time is less than 3 seconds. Hair growth is appreciated to the dorsal foot and digits.    Assessment:     1. Peripheral vascular disease    2. Stage 3b chronic kidney disease    3. Onychomycosis        Plan:     Peripheral vascular disease    Stage 3b chronic kidney disease    Onychomycosis  -     Ambulatory referral/consult to Podiatry        We discussed both conservative and surgical treatment for onychomycosis.  Definitive antifungal treatment options were discussed and reviewed with the patient at initial visit. We discussed oral medication, topical medication, and Vicks vapor rub with both risk and benefits of each of these options.  Also discussed temporary versus permanent removal of the entire nail plate to prevent recurrence.      Dystrophic nail plates, as outlined above (R#1,2,5  ; L#1,2,5 ), are sharply debrided with double action nail nipper, and/or with the assistance of a mechanical rotary rony, with removal of all offending nail and nail border(s), for reduction of pains. Nails are reduced in terms of length, width and girth with removal of subungual debris to facilitate pain free weight bearing and ambulation. The elongated nails as outlined in the objective portion of this note, were trimmed to appropriate length, with a double action nail nipper, for alleviation/reduction of pains as well. Follow up in approx. 3-4 months.  Future Appointments   Date Time Provider Department Center   6/23/2023 12:15 PM [facility] US3 [facility] [place]   6/27/2023 11:30 AM PULMONARY LAB 2, [facility] [place]   6/27/2023 12:15 PM PULMONARY LAB 2, [facility] [place]   6/27/2023  1:30 PM Violet Pacheco PA-C [facility] [place]   9/7/2023  1:00 PM Alice Ruiz NP [facility]

## 2023-06-23 ENCOUNTER — HOSPITAL ENCOUNTER (OUTPATIENT)
Dept: RADIOLOGY | Facility: HOSPITAL | Age: 83
Discharge: HOME | End: 2023-06-23
Attending: INTERNAL MEDICINE
Payer: MEDICARE

## 2023-06-23 DIAGNOSIS — F17.218 NICOTINE DEPENDENCE, CIGARETTES, WITH OTHER NICOTINE-INDUCED DISORDERS: ICD-10-CM

## 2023-06-23 DIAGNOSIS — I15.0 RENOVASCULAR HYPERTENSION: ICD-10-CM

## 2023-06-23 PROCEDURE — 76706 US ABDL AORTA SCREEN AAA: CPT | Mod: 26,,, | Performed by: RADIOLOGY

## 2023-06-23 PROCEDURE — 76706 US ABDL AORTA SCREEN AAA: CPT | Mod: TC

## 2023-06-23 PROCEDURE — 76706 US AAA SCREENING: ICD-10-PCS | Mod: 26,,, | Performed by: RADIOLOGY

## 2023-06-23 NOTE — PROGRESS NOTES
Pt does not use [facility] pt portal - please call w message below:    Please call Mr. Perez to let him know US shows he has atherosclerosis - or cholesterol build up - in his aorta - but no anueurysm - in other words abdominal aorta not overly enlarged

## 2023-06-27 ENCOUNTER — CLINICAL SUPPORT (OUTPATIENT)
Dept: PULMONOLOGY | Facility: CLINIC | Age: 83
End: 2023-06-27
Payer: MEDICARE

## 2023-06-27 ENCOUNTER — OFFICE VISIT (OUTPATIENT)
Dept: PULMONOLOGY | Facility: CLINIC | Age: 83
End: 2023-06-27
Payer: MEDICARE

## 2023-06-27 VITALS
DIASTOLIC BLOOD PRESSURE: 57 MMHG | SYSTOLIC BLOOD PRESSURE: 131 MMHG | OXYGEN SATURATION: 99 % | HEART RATE: 67 BPM | HEIGHT: 70 IN | WEIGHT: 109.38 LBS | HEIGHT: 70 IN | WEIGHT: 109.38 LBS | RESPIRATION RATE: 12 BRPM | BODY MASS INDEX: 15.66 KG/M2 | BODY MASS INDEX: 15.66 KG/M2

## 2023-06-27 DIAGNOSIS — F17.218 NICOTINE DEPENDENCE, CIGARETTES, WITH OTHER NICOTINE-INDUCED DISORDERS: ICD-10-CM

## 2023-06-27 DIAGNOSIS — J44.9 CHRONIC OBSTRUCTIVE PULMONARY DISEASE, UNSPECIFIED COPD TYPE: ICD-10-CM

## 2023-06-27 DIAGNOSIS — J43.2 CENTRILOBULAR EMPHYSEMA: Primary | ICD-10-CM

## 2023-06-27 DIAGNOSIS — J43.2 CENTRILOBULAR EMPHYSEMA: ICD-10-CM

## 2023-06-27 LAB
BRPFT: ABNORMAL
DLCO ADJ PRE: 11.21 ML/(MIN*MMHG) (ref 17.76–31.62)
DLCO SINGLE BREATH LLN: 17.76
DLCO SINGLE BREATH PRE REF: 40.5 %
DLCO SINGLE BREATH REF: 24.69
DLCOC SBVA LLN: 2.34
DLCOC SBVA PRE REF: 85.8 %
DLCOC SBVA REF: 3.46
DLCOC SINGLE BREATH LLN: 17.76
DLCOC SINGLE BREATH PRE REF: 45.4 %
DLCOC SINGLE BREATH REF: 24.69
DLCOVA LLN: 2.34
DLCOVA PRE REF: 76.6 %
DLCOVA PRE: 2.65 ML/(MIN*MMHG*L) (ref 2.34–4.58)
DLCOVA REF: 3.46
DLVAADJ PRE: 2.96 ML/(MIN*MMHG*L) (ref 2.34–4.58)
ERV LLN: -16449.11
ERV PRE REF: 51.4 %
ERV REF: 0.89
FEF 25 75 CHG: -3.1 %
FEF 25 75 LLN: 0.68
FEF 25 75 POST REF: 68.1 %
FEF 25 75 PRE REF: 70.4 %
FEF 25 75 REF: 1.9
FET100 CHG: 1.7 %
FEV1 CHG: 1.1 %
FEV1 FVC CHG: -3.1 %
FEV1 FVC LLN: 59
FEV1 FVC POST REF: 100.6 %
FEV1 FVC PRE REF: 103.8 %
FEV1 FVC REF: 74
FEV1 LLN: 1.89
FEV1 POST REF: 59.7 %
FEV1 PRE REF: 59.1 %
FEV1 REF: 2.77
FRCPLETH LLN: 2.84
FRCPLETH PREREF: 98.5 %
FRCPLETH REF: 3.82
FVC CHG: 4.3 %
FVC LLN: 2.7
FVC POST REF: 58.7 %
FVC PRE REF: 56.3 %
FVC REF: 3.78
IVC PRE: 1.93 L (ref 2.7–4.86)
IVC SINGLE BREATH LLN: 2.7
IVC SINGLE BREATH PRE REF: 51 %
IVC SINGLE BREATH REF: 3.78
MVV LLN: 94
MVV PRE REF: 41.7 %
MVV REF: 110
PEF CHG: 10.4 %
PEF LLN: 4.54
PEF POST REF: 59.5 %
PEF PRE REF: 53.9 %
PEF REF: 6.86
POST FEF 25 75: 1.3 L/S (ref 0.68–3.12)
POST FET 100: 7.02 SEC
POST FEV1 FVC: 74.63 % (ref 58.93–89.46)
POST FEV1: 1.66 L (ref 1.89–3.65)
POST FVC: 2.22 L (ref 2.7–4.86)
POST PEF: 4.08 L/S (ref 4.54–9.19)
PRE DLCO: 10.01 ML/(MIN*MMHG) (ref 17.76–31.62)
PRE ERV: 0.46 L (ref -16449.11–16450.89)
PRE FEF 25 75: 1.34 L/S (ref 0.68–3.12)
PRE FET 100: 6.9 SEC
PRE FEV1 FVC: 77.01 % (ref 58.93–89.46)
PRE FEV1: 1.64 L (ref 1.89–3.65)
PRE FRC PL: 3.76 L
PRE FVC: 2.13 L (ref 2.7–4.86)
PRE MVV: 46 L/MIN (ref 93.74–126.82)
PRE PEF: 3.7 L/S (ref 4.54–9.19)
PRE RV: 2.98 L (ref 2.25–3.6)
PRE TLC: 5.22 L (ref 5.99–8.29)
RAW LLN: 3.06
RAW PRE REF: 169.4 %
RAW PRE: 5.18 CMH2O*S/L (ref 3.06–3.06)
RAW REF: 3.06
RV LLN: 2.25
RV PRE REF: 101.8 %
RV REF: 2.93
RVTLC LLN: 37
RVTLC PRE REF: 123.1 %
RVTLC PRE: 57.04 % (ref 37.35–55.31)
RVTLC REF: 46
TLC LLN: 5.99
TLC PRE REF: 73.1 %
TLC REF: 7.14
VA PRE: 3.78 L (ref 6.99–6.99)
VA SINGLE BREATH LLN: 6.99
VA SINGLE BREATH PRE REF: 54.1 %
VA SINGLE BREATH REF: 6.99
VC LLN: 2.7
VC PRE REF: 59.3 %
VC PRE: 2.24 L (ref 2.7–4.86)
VC REF: 3.78
VTGRAWPRE: 3.98 L

## 2023-06-27 PROCEDURE — 1159F PR MEDICATION LIST DOCUMENTED IN MEDICAL RECORD: ICD-10-PCS | Mod: CPTII,S$GLB,, | Performed by: HOSPITALIST

## 2023-06-27 PROCEDURE — 1160F RVW MEDS BY RX/DR IN RCRD: CPT | Mod: CPTII,S$GLB,, | Performed by: HOSPITALIST

## 2023-06-27 PROCEDURE — 1159F MED LIST DOCD IN RCRD: CPT | Mod: CPTII,S$GLB,, | Performed by: HOSPITALIST

## 2023-06-27 PROCEDURE — 3075F SYST BP GE 130 - 139MM HG: CPT | Mod: CPTII,S$GLB,, | Performed by: HOSPITALIST

## 2023-06-27 PROCEDURE — 94726 PULM FUNCT TST PLETHYSMOGRAP: ICD-10-PCS | Mod: S$GLB,,, | Performed by: INTERNAL MEDICINE

## 2023-06-27 PROCEDURE — 94726 PLETHYSMOGRAPHY LUNG VOLUMES: CPT | Mod: S$GLB,,, | Performed by: INTERNAL MEDICINE

## 2023-06-27 PROCEDURE — 3075F PR MOST RECENT SYSTOLIC BLOOD PRESS GE 130-139MM HG: ICD-10-PCS | Mod: CPTII,S$GLB,, | Performed by: HOSPITALIST

## 2023-06-27 PROCEDURE — 3078F PR MOST RECENT DIASTOLIC BLOOD PRESSURE < 80 MM HG: ICD-10-PCS | Mod: CPTII,S$GLB,, | Performed by: HOSPITALIST

## 2023-06-27 PROCEDURE — 3288F PR FALLS RISK ASSESSMENT DOCUMENTED: ICD-10-PCS | Mod: CPTII,S$GLB,, | Performed by: HOSPITALIST

## 2023-06-27 PROCEDURE — 3078F DIAST BP <80 MM HG: CPT | Mod: CPTII,S$GLB,, | Performed by: HOSPITALIST

## 2023-06-27 PROCEDURE — 1160F PR REVIEW ALL MEDS BY PRESCRIBER/CLIN PHARMACIST DOCUMENTED: ICD-10-PCS | Mod: CPTII,S$GLB,, | Performed by: HOSPITALIST

## 2023-06-27 PROCEDURE — 94729 DIFFUSING CAPACITY: CPT | Mod: S$GLB,,, | Performed by: INTERNAL MEDICINE

## 2023-06-27 PROCEDURE — 99213 OFFICE O/P EST LOW 20 MIN: CPT | Mod: S$GLB,,, | Performed by: HOSPITALIST

## 2023-06-27 PROCEDURE — 94060 EVALUATION OF WHEEZING: CPT | Mod: S$GLB,,, | Performed by: INTERNAL MEDICINE

## 2023-06-27 PROCEDURE — 1101F PR PT FALLS ASSESS DOC 0-1 FALLS W/OUT INJ PAST YR: ICD-10-PCS | Mod: CPTII,S$GLB,, | Performed by: HOSPITALIST

## 2023-06-27 PROCEDURE — 94060 PR EVAL OF BRONCHOSPASM: ICD-10-PCS | Mod: S$GLB,,, | Performed by: INTERNAL MEDICINE

## 2023-06-27 PROCEDURE — 94729 PR C02/MEMBANE DIFFUSE CAPACITY: ICD-10-PCS | Mod: S$GLB,,, | Performed by: INTERNAL MEDICINE

## 2023-06-27 PROCEDURE — 94618 PULMONARY STRESS TESTING: ICD-10-PCS | Mod: S$GLB,,, | Performed by: INTERNAL MEDICINE

## 2023-06-27 PROCEDURE — 99999 PR PBB SHADOW E&M-EST. PATIENT-LVL III: ICD-10-PCS | Mod: PBBFAC,,, | Performed by: HOSPITALIST

## 2023-06-27 PROCEDURE — 99999 PR PBB SHADOW E&M-EST. PATIENT-LVL III: CPT | Mod: PBBFAC,,, | Performed by: HOSPITALIST

## 2023-06-27 PROCEDURE — 1126F AMNT PAIN NOTED NONE PRSNT: CPT | Mod: CPTII,S$GLB,, | Performed by: HOSPITALIST

## 2023-06-27 PROCEDURE — 1126F PR PAIN SEVERITY QUANTIFIED, NO PAIN PRESENT: ICD-10-PCS | Mod: CPTII,S$GLB,, | Performed by: HOSPITALIST

## 2023-06-27 PROCEDURE — 1101F PT FALLS ASSESS-DOCD LE1/YR: CPT | Mod: CPTII,S$GLB,, | Performed by: HOSPITALIST

## 2023-06-27 PROCEDURE — 3288F FALL RISK ASSESSMENT DOCD: CPT | Mod: CPTII,S$GLB,, | Performed by: HOSPITALIST

## 2023-06-27 PROCEDURE — 99213 PR OFFICE/OUTPT VISIT, EST, LEVL III, 20-29 MIN: ICD-10-PCS | Mod: S$GLB,,, | Performed by: HOSPITALIST

## 2023-06-27 PROCEDURE — 94618 PULMONARY STRESS TESTING: CPT | Mod: S$GLB,,, | Performed by: INTERNAL MEDICINE

## 2023-06-27 NOTE — PROGRESS NOTES
Subjective:      Patient ID: Rigoberto Perez is a 82 y.o. male.    Chief Complaint: Emphysema    Interval Hx 6/27/23:    Mr. Perez presents to clinic for follow up emphysema and pulmonary testing. He was seen for the first and most recent time 5/22/2023, at that visit pt was controlled on Trelegy, albuterol, and his wife's nebulizer as needed. Further work up was ordered in the way of PFT and walk.     Pt states that he has been cutting down on smoking. Feels like breathing is doing ok, but does struggle with dyspnea with increased humidity and heat in the summer. No productive cough.    States most he has weighed was 145 pounds when he was 16 year old. Has tried ensure/boost, didn't help.     Interim Testing:  PFT 6/2023- Restriction with decreased DLCO- 40.5% and DLCO/VA <80%, flow loop with some obstruction  Walk 6/2023- Walked 350 meters, SpO2 range 98-99%    Pulmonary interventions:  Trelegy- using daily  Duoneb- no increase in use  Albuterol- used 4-5 days ago    HPI 5/22/23:    82 year old male with history of CVA, emphysema, PVD, CHF, CKD3, nicotine dependence who presents to Pulmonary clinic for evaluation of COPD. History provided by patient and his spouse. He has been treated for COPD by his PCP with Trelegy and albuterol as needed. He occasionally uses his wife's nebulizer for treatments. Denies admissions or outpatient treatment with abx/steroids for respiratory issues in the past 5 years. Denies night sweats or decreased appetite. No cough.      Pertinent Work Up:  CXR 6/2022- Hyperexpansion with flattened diaphragms, small right effusion  CTA Chest 2/2021- (care everywhere, not able to view image) No nodules noted. Paraseptal and centrilobar emphysema  ECHO 2/2021- EF 60%, mild TR     Pulmonary Interventions:   Trelegy- Once per day  Duoneb- using wife's machine, last used last week  Albuterol- been several weeks since had to use     Smoking hx: Current smoker, .5ppd, started smoking at age of  11, was a much heavier smoker at one time, up to 3 packs/day  Occupational hx: [employer]    Review of Systems   Respiratory:  Positive for shortness of breath and dyspnea on extertion.    Objective:     Physical Exam   Constitutional: He is oriented to person, place, and time.   Thin older male, awake and alert, in no distress   Cardiovascular: Normal rate and regular rhythm.   Pulmonary/Chest:   No wheezing, normal effort at rest   Neurological: He is alert and oriented to person, place, and time.   Skin: Skin is warm and dry.   Personal Diagnostic Review  As Above  No flowsheet data found.     Assessment:     No diagnosis found.     Outpatient Encounter Medications as of 6/27/2023   Medication Sig Dispense Refill    albuterol (PROAIR HFA) 90 mcg/actuation inhaler Inhale 2 puffs into the lungs every 6 (six) hours as needed for Wheezing. Rescue 18 g 1    amLODIPine (NORVASC) 5 MG tablet Take 1 tablet (5 mg total) by mouth once daily. 30 tablet 11    aspirin (ECOTRIN) 81 MG EC tablet Take 1 tablet (81 mg total) by mouth once daily. 30 tablet 5    azelastine (ASTELIN) 137 mcg (0.1 %) nasal spray 1 spray (137 mcg total) by Nasal route 2 (two) times daily as needed for Rhinitis (sneezing). 30 mL 11    blood pressure monitor Kit 1 kit by Misc.(Non-Drug; Combo Route) route once daily. 1 each 0    cholecalciferol, vitamin D3, (VITAMIN D3) 25 mcg (1,000 unit) capsule Take 2 capsules (2,000 Units total) by mouth once daily. 180 capsule 1    clotrimazole (LOTRIMIN) 1 % cream Apply topically 2 (two) times daily. 24 g 1    folic acid (FOLVITE) 1 MG tablet Take 1 tablet (1 mg total) by mouth once daily. 90 tablet 1    nebulizer and compressor Ivon Use as directed      rosuvastatin (CRESTOR) 10 MG tablet Take 1 tablet (10 mg total) by mouth once daily. 30 tablet 5    TRELEGY ELLIPTA 100-62.5-25 mcg DsDv Inhale 1 puff into the lungs once daily. 30 each 5    [DISCONTINUED] cyanocobalamin 500 MCG tablet Take 1 tablet (500 mcg total)  by mouth once daily. (Patient not taking: Reported on 5/22/2023) 90 tablet 1     No facility-administered encounter medications on file as of 6/27/2023.       Plan:     Problem List Items Addressed This Visit          Pulmonary    Centrilobular emphysema - Primary     - PFT and walk reviewed  - continue Trelegy and albuterol as needed  - not in exacerbation              Other    Nicotine dependence, cigarettes, with other nicotine-induced disorders     - continues to smoke, not interested in quitting  - cut down recently secondary to weather/heat  - out of guidelines for LDCT, report of CTA 2021 without nodules          Plan discussed with patient and he expressed understanding, all questions answered. Follow up in 6 months, or sooner as needed.

## 2023-06-27 NOTE — ASSESSMENT & PLAN NOTE
- continues to smoke, not interested in quitting  - cut down recently secondary to weather/heat  - out of guidelines for LDCT, report of CTA 2021 without nodules

## 2023-06-27 NOTE — PROCEDURES
[facility]  Six Minute [facility]     SUMMARY     Ordering Provider: ROBERT Pacheco   Interpreting Provider:   Performing nurse/tech/RT: SRIKATNH Patricia RRT  Diagnosis:  (Centrilobular Emphysema)  Height: 5' 10" (177.8 cm)  Weight: 49.6 kg (109 lb 5.6 oz)  BMI (Calculated): 15.7   Patient Race:             Phase Oxygen Assessment Supplemental O2 Heart   Rate Blood Pressure Handy Dyspnea Scale Rating   Resting 99 % Room Air 67 bpm 131/57 0   Exercise        Minute        1 98 % Room Air 74 bpm     2 99 % Room Air 77 bpm     3 98 % Room Air 80 bpm     4 98 % Room Air 82 bpm     5 98 % Room Air 83 bpm     6  98 % Room Air 82 bpm 138/61 0   Recovery        Minute        1 99 % Room Air 76 bpm     2 99 % Room Air 72 bpm     3 100 % Room Air 70 bpm     4 100 % Room Air 68 bpm 127/60 1     Six Minute Walk Summary  6MWT Status: completed without stopping  Patient Reported: Leg pain, Lightheadedness, Dizziness     Interpretation:  Did the patient stop or pause?: No      Total Time Walked (Calculated): 360 seconds  Final Partial Lap Distance (feet): 150 feet  Total Distance Meters (Calculated): 350.52 meters  Predicted Distance Meters (Calculated): 532.99 meters  Percentage of Predicted (Calculated): 65.76  Peak VO2 (Calculated): 14.5  Mets: 4.14  Has The Patient Had a Previous Six Minute Walk Test?: No       Previous 6MWT Results  Has The Patient Had a Previous Six Minute Walk Test?: No      Six minute walk distance is 350.52m /532.99 meters (65.76 % predicted) with no.Patient did complete the study, walking 360 seconds of the 360 second test . During exercise, there was no  significant desaturation while breathing room air .Lowest oxygen saturation was 98% .Maximum heart rate during exercise was 83 bpm which is 60 % of maximum predicted heart rate of 137 bpm. Blood pressure remained stable and Heart rate remained stable Based upon age and body mass index, exercise capacity is less than predicted.  " Peak VO2 during walking was 14.5 ml/kg/min which is 41 % of predicted Peak VO2 max of 35 ml/kg/min based on a resting heart rate of 67/min.    Patient has not had a previous study. No previous study performed.

## 2023-06-29 ENCOUNTER — TELEPHONE (OUTPATIENT)
Dept: PRIMARY CARE CLINIC | Facility: CLINIC | Age: 83
End: 2023-06-29
Payer: MEDICARE

## 2023-06-29 NOTE — TELEPHONE ENCOUNTER
----- Message from Trinidad Bai MD sent at 6/23/2023  4:54 PM CDT -----  Pt does not use [facility] pt portal - please call w message below:    Please call Mr. Perez to let him know US shows he has atherosclerosis - or cholesterol build up - in his aorta - but no anueurysm - in other words abdominal aorta not overly enlarged

## 2023-07-02 PROBLEM — F10.21 ALCOHOL USE DISORDER, MODERATE, IN SUSTAINED REMISSION: Chronic | Status: ACTIVE | Noted: 2022-10-28

## 2023-07-02 PROBLEM — F17.218 NICOTINE DEPENDENCE, CIGARETTES, WITH OTHER NICOTINE-INDUCED DISORDERS: Chronic | Status: ACTIVE | Noted: 2019-01-23

## 2023-07-31 ENCOUNTER — OFFICE VISIT (OUTPATIENT)
Dept: PRIMARY CARE CLINIC | Facility: CLINIC | Age: 83
End: 2023-07-31
Payer: MEDICARE

## 2023-07-31 VITALS
DIASTOLIC BLOOD PRESSURE: 56 MMHG | HEART RATE: 83 BPM | BODY MASS INDEX: 15.53 KG/M2 | TEMPERATURE: 98 F | HEIGHT: 70 IN | SYSTOLIC BLOOD PRESSURE: 122 MMHG | WEIGHT: 108.5 LBS | OXYGEN SATURATION: 99 %

## 2023-07-31 DIAGNOSIS — I15.0 RENOVASCULAR HYPERTENSION: ICD-10-CM

## 2023-07-31 DIAGNOSIS — H61.23 BILATERAL IMPACTED CERUMEN: ICD-10-CM

## 2023-07-31 DIAGNOSIS — F17.218 NICOTINE DEPENDENCE, CIGARETTES, WITH OTHER NICOTINE-INDUCED DISORDERS: Chronic | ICD-10-CM

## 2023-07-31 DIAGNOSIS — I95.1 ORTHOSTASIS: Primary | ICD-10-CM

## 2023-07-31 PROBLEM — D69.6 THROMBOCYTOPENIA: Status: RESOLVED | Noted: 2022-08-29 | Resolved: 2023-07-31

## 2023-07-31 PROBLEM — Z11.59 ENCOUNTER FOR HEPATITIS C SCREENING TEST FOR LOW RISK PATIENT: Status: RESOLVED | Noted: 2022-08-29 | Resolved: 2023-07-31

## 2023-07-31 PROCEDURE — 3288F PR FALLS RISK ASSESSMENT DOCUMENTED: ICD-10-PCS | Mod: HCNC,CPTII,S$GLB, | Performed by: INTERNAL MEDICINE

## 2023-07-31 PROCEDURE — 3074F SYST BP LT 130 MM HG: CPT | Mod: HCNC,CPTII,S$GLB, | Performed by: INTERNAL MEDICINE

## 2023-07-31 PROCEDURE — 1101F PR PT FALLS ASSESS DOC 0-1 FALLS W/OUT INJ PAST YR: ICD-10-PCS | Mod: HCNC,CPTII,S$GLB, | Performed by: INTERNAL MEDICINE

## 2023-07-31 PROCEDURE — 99215 PR OFFICE/OUTPT VISIT, EST, LEVL V, 40-54 MIN: ICD-10-PCS | Mod: HCNC,S$GLB,, | Performed by: INTERNAL MEDICINE

## 2023-07-31 PROCEDURE — 3288F FALL RISK ASSESSMENT DOCD: CPT | Mod: HCNC,CPTII,S$GLB, | Performed by: INTERNAL MEDICINE

## 2023-07-31 PROCEDURE — 3074F PR MOST RECENT SYSTOLIC BLOOD PRESSURE < 130 MM HG: ICD-10-PCS | Mod: HCNC,CPTII,S$GLB, | Performed by: INTERNAL MEDICINE

## 2023-07-31 PROCEDURE — 99999 PR PBB SHADOW E&M-EST. PATIENT-LVL III: CPT | Mod: PBBFAC,HCNC,, | Performed by: INTERNAL MEDICINE

## 2023-07-31 PROCEDURE — 1160F RVW MEDS BY RX/DR IN RCRD: CPT | Mod: HCNC,CPTII,S$GLB, | Performed by: INTERNAL MEDICINE

## 2023-07-31 PROCEDURE — 3078F PR MOST RECENT DIASTOLIC BLOOD PRESSURE < 80 MM HG: ICD-10-PCS | Mod: HCNC,CPTII,S$GLB, | Performed by: INTERNAL MEDICINE

## 2023-07-31 PROCEDURE — 1159F MED LIST DOCD IN RCRD: CPT | Mod: HCNC,CPTII,S$GLB, | Performed by: INTERNAL MEDICINE

## 2023-07-31 PROCEDURE — 1159F PR MEDICATION LIST DOCUMENTED IN MEDICAL RECORD: ICD-10-PCS | Mod: HCNC,CPTII,S$GLB, | Performed by: INTERNAL MEDICINE

## 2023-07-31 PROCEDURE — 1160F PR REVIEW ALL MEDS BY PRESCRIBER/CLIN PHARMACIST DOCUMENTED: ICD-10-PCS | Mod: HCNC,CPTII,S$GLB, | Performed by: INTERNAL MEDICINE

## 2023-07-31 PROCEDURE — 1126F AMNT PAIN NOTED NONE PRSNT: CPT | Mod: HCNC,CPTII,S$GLB, | Performed by: INTERNAL MEDICINE

## 2023-07-31 PROCEDURE — 3078F DIAST BP <80 MM HG: CPT | Mod: HCNC,CPTII,S$GLB, | Performed by: INTERNAL MEDICINE

## 2023-07-31 PROCEDURE — 99999 PR PBB SHADOW E&M-EST. PATIENT-LVL III: ICD-10-PCS | Mod: PBBFAC,HCNC,, | Performed by: INTERNAL MEDICINE

## 2023-07-31 PROCEDURE — 99215 OFFICE O/P EST HI 40 MIN: CPT | Mod: HCNC,S$GLB,, | Performed by: INTERNAL MEDICINE

## 2023-07-31 PROCEDURE — 1101F PT FALLS ASSESS-DOCD LE1/YR: CPT | Mod: HCNC,CPTII,S$GLB, | Performed by: INTERNAL MEDICINE

## 2023-07-31 PROCEDURE — 1126F PR PAIN SEVERITY QUANTIFIED, NO PAIN PRESENT: ICD-10-PCS | Mod: HCNC,CPTII,S$GLB, | Performed by: INTERNAL MEDICINE

## 2023-07-31 RX ORDER — AMLODIPINE BESYLATE 5 MG/1
2.5 TABLET ORAL DAILY
Qty: 15 TABLET | Refills: 11 | Status: SHIPPED | OUTPATIENT
Start: 2023-07-31 | End: 2023-09-12

## 2023-07-31 NOTE — PATIENT INSTRUCTIONS
Please make sure staying well-hydrated  Recommend decrease amlodipine to 1/2 tab daily  Please check BP at home prior to taking BP medicine and again 2-3 hrs after and let us know how it's running  Please let us know if dizziness/lightheadedness doesn't improve    Debrox 5 drops both ears 2x daily for 4 days

## 2023-07-31 NOTE — ASSESSMENT & PLAN NOTE
Given sig drop in BP from lying to stand recommend decrease amlodipine - make sure staying hydrated! F/u on home BP's

## 2023-07-31 NOTE — ASSESSMENT & PLAN NOTE
Standing BP at goal w/o anti-hypertensive - recommend decrease amlodipine dose to 1/2 tab - 2.5 mg daily - f/u home BP's - hydrate!

## 2023-07-31 NOTE — PROGRESS NOTES
"Rigoberto Perez  07/31/2023  [id number]    Trinidad Bai MD  Patient Care Team:  Trinidad Bai MD as PCP - General (Internal Medicine)  Dwight Rubio MD as Consulting Physician (Otolaryngology)  Joan Cadena NP as Nurse Practitioner (Family Medicine)  Alice Ruiz NP as Nurse Practitioner (Family Medicine)    Visit Type: Follow-up    Chief Complaint:  Feeling weak    History of Present Illness: Mr. Rigoberto Perez is an 82 year old male here with his wife who expresses concern and asked that he be seen today as well.     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     C/o feeling "weak" particularly when moving from lying or sitting to stand  Did not take BP medication - amlodipine 5 mg - yet this morning    Still smoking about 1/2 pack day - has been smoking 70 years - hard to quit  Declines referral to smoking cessation - says patches didn't work    From last visit w me 6/06/23   Chronic exertional dyspnea. Chronically underweight.   Still smoking - cutting back slowly - down to about half a pack a day  Has BP monitor at home but not checking BP at home  New glasses  Looking better today than on previous visits.  Says he's been feeling better  PHQ-4 Score: 0     PHQ-4 Score: 0     Recent appointments:   6/06/23 [facility] Bai  5/22/23 Tianna Pacheco COPD/emphysema  4/27/23 [facility] Joseph E[facility]  3/08/23 [place]     Upcoming appointments:  Future Appointments       Date Provider Specialty Appt Notes    9/7/2023 Alice Ruiz NP Primary Care 3 month f/u    12/19/2023 Violet Pacheco PA-C Pulmonology copd 6 month f/u           The following were reviewed: Active problem list, medication list, allergies, family history, social history, and Health Maintenance.     Medications have been reviewed and reconciled with patient at visit today.    Review of Systems "   Constitutional:  Negative for activity change, appetite change and unexpected weight change.   HENT:  Positive for hearing loss. Negative for sinus pressure/congestion.    Eyes:  Negative for pain.   Respiratory:  Positive for cough.    Cardiovascular:  Negative for chest pain and palpitations.   Gastrointestinal:  Negative for abdominal pain, change in bowel habit, nausea and change in bowel habit.   Integumentary:         Dry peeling skin on feet - not itchy   Neurological:  Positive for weakness and light-headedness.      Exam:  Vitals:    07/31/23 1124   BP: (!) 122/56   Pulse: 83   Temp: 97.8 °F (36.6 °C)     Weight: 49.2 kg (108 lb 8 oz)   Body mass index is 15.57 kg/m².    BP Readings from Last 3 Encounters:   07/31/23 (!) 122/56   06/27/23 (!) 131/57   06/06/23 118/72      Orthostatics  Lying   151/67  66   Stand 1 min 122/70  75  Stand 3 min  144/70  72    Wt Readings from Last 3 Encounters:   07/31/23 1124 49.2 kg (108 lb 8 oz)   06/27/23 1211 49.6 kg (109 lb 5.6 oz)   06/27/23 1201 49.6 kg (109 lb 5.6 oz)      Physical Exam  Vitals reviewed.   Constitutional:       General: He is not in acute distress.     Appearance: He is ill-appearing.      Comments: underweight   HENT:      Head: Atraumatic.      Right Ear: External ear normal. There is impacted cerumen.      Left Ear: External ear normal. There is impacted cerumen.      Nose: Nose normal.      Mouth/Throat:      Mouth: Mucous membranes are moist.      Pharynx: Oropharynx is clear.      Comments: Plate on top  Missing teeth back lower jaw B  Eyes:      General: No scleral icterus.     Extraocular Movements: Extraocular movements intact.      Conjunctiva/sclera: Conjunctivae normal.      Comments: glasses   Neck:      Vascular: Carotid bruit present.   Cardiovascular:      Rate and Rhythm: Normal rate and regular rhythm.      Comments: Distant heart sounds  Pulmonary:      Effort: Pulmonary effort is normal.      Breath sounds: No wheezing.       Comments: Somewhat diminished breath sounds  Intermittent non-productive cough  clubbing  Abdominal:      General: Bowel sounds are normal.      Palpations: Abdomen is soft.      Tenderness: There is no abdominal tenderness. There is no guarding.   Musculoskeletal:      Right lower leg: No edema.      Left lower leg: No edema.   Lymphadenopathy:      Cervical: No cervical adenopathy.   Skin:     General: Skin is warm and dry.      Findings: Bruising present.      Comments: Fingernails thin, long, curling over fingertips  Toenails thick yellow overgrown and crumbling  Dry yellow patches/plaques plantar surface of B feet but skin intact btwn toes w/o maceration   Neurological:      General: No focal deficit present.      Mental Status: He is alert. Mental status is at baseline.      Comments: Reports lightheaded moving from lying to stand   Psychiatric:         Mood and Affect: Mood normal.         Behavior: Behavior normal.        Laboratory Reviewed  Lab Results   Component Value Date    WBC 7.75 06/06/2023    HGB 11.3 (L) 06/06/2023    HCT 36.2 (L) 06/06/2023     06/06/2023    MCV 98 06/06/2023    CHOL 129 06/06/2023    TRIG 56 06/06/2023    HDL 53 06/06/2023    LDLCALC 64.8 06/06/2023    ALT 14 06/06/2023    AST 24 06/06/2023     06/06/2023    K 5.1 06/06/2023     06/06/2023    CREATININE 1.3 06/06/2023    BUN 21 06/06/2023    CO2 23 06/06/2023    MG 2.0 08/29/2022    TSH 1.404 08/29/2022    FREET4 1.05 08/29/2022    HGBA1C 5.3 06/06/2023     Lab Results   Component Value Date    PTH 75.9 06/06/2023    CALCIUM 10.4 06/06/2023    PHOS 3.8 08/29/2022      Lab Results   Component Value Date    [id number] 478 06/06/2023     Lab Results   Component Value Date    FOLATE 4.7 06/06/2023      Lab Results   Component Value Date    IRON 73 06/06/2023    TRANSFERRIN 229 06/06/2023    TIBC 339 06/06/2023    FESATURATED 22 06/06/2023      Lab Results   Component Value Date    EGFRNORACEVR 54.5 (A) 06/06/2023     ALBUMIN 4.5 06/06/2023     (H) 06/26/2022      Other labs 6/06/23 vit D 24  8/29/22 eGFR 37.1 HCV NR mma 0.43     US Thyroid 8/29/22 Small thyroid with mild diffuse parenchymal heterogeneity, possibly related to chronic sequela of thyroiditis. Multiple small benign-appearing thyroid nodules as above.  No findings meeting TIRADS criteria for biopsy or follow-up.     HILDA 8/29/22 Rt hilda and waveforms suggestive of mild disease.  Left hilda and waveforms suggestive of moderate disease.    Assessment:   82 y.o. male with multiple co-morbid illnesses here for continued follow up of medical problems.      The primary encounter diagnosis was Orthostasis. Diagnoses of Renovascular hypertension, Bilateral impacted cerumen, and Nicotine dependence, cigarettes, with other nicotine-induced disorders were also pertinent to this visit.      Plan:   1. Orthostasis  Assessment & Plan:  Standing BP at goal w/o anti-hypertensive - recommend decrease amlodipine dose to 1/2 tab - 2.5 mg daily - f/u home BP's - hydrate!      2. Renovascular hypertension  Assessment & Plan:  Given sig drop in BP from lying to stand recommend decrease amlodipine - make sure staying hydrated! F/u on home BP's      3. Bilateral impacted cerumen  Assessment & Plan:  Partial cerumen occlusion B - Recommended repeat debrox - recheck next f/u      4. Nicotine dependence, cigarettes, with other nicotine-induced disorders    Other orders  -     carbamide peroxide (DEBROX) 6.5 % otic solution; Place 5 drops into both ears 2 (two) times daily. for 5 days  Dispense: 15 mL; Refill: 0  -     amLODIPine (NORVASC) 5 MG tablet; Take 0.5 tablets (2.5 mg total) by mouth once daily.  Dispense: 15 tablet; Refill: 11         Health Maintenance         Date Due Completion Date    TETANUS VACCINE Never done ---    Shingles Vaccine (1 of 2) Never done ---    COVID-19 Vaccine (4 - Pfizer series) 04/08/2023 12/8/2022    Influenza Vaccine (1) 09/01/2023 10/14/2022     Hemoglobin A1c (Prediabetes) 06/06/2024 6/6/2023    Lipid Panel 06/06/2024 6/6/2023            -Patient's lab results were reviewed and discussed with patient  -Treatment options and alternatives were discussed with the patient. Patient expressed understanding. Patient was given the opportunity to ask questions and be an active participant in their medical care. Patient had no further questions or concerns at this time.   familywithpt: -Documentation of patient's health and condition was obtained from family member who was present during visit.   -Patient is an overall moderate to HIGH risk for health complications from their medical conditions.     Follow up: Follow up in about 5 weeks (around 9/7/2023) for Follow Up as scheduled.    Care Plan/Goals: Reviewed No   Goals         Gain weight (pt-stated)       Achievable - within patient's control: yes  Maybe    Difficulties Identified: chronic medical conditions    Plan for Overcoming difficulties: nutritional supplements    Timeframe for completion: 6 mos    Update 10/28/22: 3 lbs weight gain - has cut back smoking                      After visit summary printed and given to patient upon discharge.  Patient goals and care plan are included in After visit summary.    TOTAL TIME evaluating and managing this patient for this encounter was greater than 55 minutes. This time was spent personally by me on some of the following activities: review of patient's past medical history, assessing age-appropriate health maintenance needs, review of any interval history, review and interpretation of lab results, review and interpretation of imaging test results, review and interpretation of cardiology test results, reviewing consulting specialist notes, obtaining history from the patient and family, examination of the patient, medication reconciliation, managing and/or ordering prescription medications, ordering imaging tests, ordering referral to subspecialty provider(s),  educating patient and answering their questions about diagnosis, treatment plan, and goals of treatment, discussing planned follow-up and final documentation of the visit. This time was exclusive of any separately billable procedures for this patient and exclusive of time spent treating any other patients.

## 2023-09-12 ENCOUNTER — OFFICE VISIT (OUTPATIENT)
Dept: PRIMARY CARE CLINIC | Facility: CLINIC | Age: 83
End: 2023-09-12
Payer: MEDICARE

## 2023-09-12 ENCOUNTER — LAB VISIT (OUTPATIENT)
Dept: LAB | Facility: HOSPITAL | Age: 83
End: 2023-09-12
Attending: INTERNAL MEDICINE
Payer: MEDICARE

## 2023-09-12 ENCOUNTER — TELEPHONE (OUTPATIENT)
Dept: PRIMARY CARE CLINIC | Facility: CLINIC | Age: 83
End: 2023-09-12
Payer: MEDICARE

## 2023-09-12 VITALS
HEIGHT: 70 IN | HEART RATE: 75 BPM | SYSTOLIC BLOOD PRESSURE: 114 MMHG | TEMPERATURE: 97 F | DIASTOLIC BLOOD PRESSURE: 54 MMHG | BODY MASS INDEX: 15.22 KG/M2 | WEIGHT: 106.31 LBS | OXYGEN SATURATION: 98 %

## 2023-09-12 DIAGNOSIS — R63.4 WEIGHT LOSS: Primary | ICD-10-CM

## 2023-09-12 DIAGNOSIS — R63.6 UNDERWEIGHT: ICD-10-CM

## 2023-09-12 DIAGNOSIS — J43.2 CENTRILOBULAR EMPHYSEMA: ICD-10-CM

## 2023-09-12 DIAGNOSIS — L72.9 SCALP CYST: ICD-10-CM

## 2023-09-12 DIAGNOSIS — I50.40 COMBINED SYSTOLIC AND DIASTOLIC CONGESTIVE HEART FAILURE, UNSPECIFIED HF CHRONICITY: ICD-10-CM

## 2023-09-12 DIAGNOSIS — R63.4 WEIGHT LOSS: ICD-10-CM

## 2023-09-12 LAB
BASOPHILS # BLD AUTO: 0.07 K/UL (ref 0–0.2)
BASOPHILS NFR BLD: 1.2 % (ref 0–1.9)
DIFFERENTIAL METHOD: ABNORMAL
EOSINOPHIL # BLD AUTO: 0.3 K/UL (ref 0–0.5)
EOSINOPHIL NFR BLD: 5.6 % (ref 0–8)
ERYTHROCYTE [DISTWIDTH] IN BLOOD BY AUTOMATED COUNT: 13.2 % (ref 11.5–14.5)
HCT VFR BLD AUTO: 35.5 % (ref 40–54)
HGB BLD-MCNC: 11 G/DL (ref 14–18)
IMM GRANULOCYTES # BLD AUTO: 0.01 K/UL (ref 0–0.04)
IMM GRANULOCYTES NFR BLD AUTO: 0.2 % (ref 0–0.5)
LYMPHOCYTES # BLD AUTO: 1 K/UL (ref 1–4.8)
LYMPHOCYTES NFR BLD: 16.6 % (ref 18–48)
MCH RBC QN AUTO: 30.1 PG (ref 27–31)
MCHC RBC AUTO-ENTMCNC: 31 G/DL (ref 32–36)
MCV RBC AUTO: 97 FL (ref 82–98)
MONOCYTES # BLD AUTO: 0.5 K/UL (ref 0.3–1)
MONOCYTES NFR BLD: 7.8 % (ref 4–15)
NEUTROPHILS # BLD AUTO: 4.1 K/UL (ref 1.8–7.7)
NEUTROPHILS NFR BLD: 68.6 % (ref 38–73)
NRBC BLD-RTO: 0 /100 WBC
PLATELET # BLD AUTO: 130 K/UL (ref 150–450)
PMV BLD AUTO: 11.2 FL (ref 9.2–12.9)
RBC # BLD AUTO: 3.65 M/UL (ref 4.6–6.2)
WBC # BLD AUTO: 6.03 K/UL (ref 3.9–12.7)

## 2023-09-12 PROCEDURE — 3078F PR MOST RECENT DIASTOLIC BLOOD PRESSURE < 80 MM HG: ICD-10-PCS | Mod: HCNC,CPTII,S$GLB, | Performed by: NURSE PRACTITIONER

## 2023-09-12 PROCEDURE — 84439 ASSAY OF FREE THYROXINE: CPT | Mod: HCNC | Performed by: NURSE PRACTITIONER

## 2023-09-12 PROCEDURE — 84443 ASSAY THYROID STIM HORMONE: CPT | Mod: HCNC | Performed by: NURSE PRACTITIONER

## 2023-09-12 PROCEDURE — 99214 OFFICE O/P EST MOD 30 MIN: CPT | Mod: HCNC,S$GLB,, | Performed by: NURSE PRACTITIONER

## 2023-09-12 PROCEDURE — 1101F PR PT FALLS ASSESS DOC 0-1 FALLS W/OUT INJ PAST YR: ICD-10-PCS | Mod: HCNC,CPTII,S$GLB, | Performed by: NURSE PRACTITIONER

## 2023-09-12 PROCEDURE — 3288F PR FALLS RISK ASSESSMENT DOCUMENTED: ICD-10-PCS | Mod: HCNC,CPTII,S$GLB, | Performed by: NURSE PRACTITIONER

## 2023-09-12 PROCEDURE — 1159F PR MEDICATION LIST DOCUMENTED IN MEDICAL RECORD: ICD-10-PCS | Mod: HCNC,CPTII,S$GLB, | Performed by: NURSE PRACTITIONER

## 2023-09-12 PROCEDURE — 1160F PR REVIEW ALL MEDS BY PRESCRIBER/CLIN PHARMACIST DOCUMENTED: ICD-10-PCS | Mod: HCNC,CPTII,S$GLB, | Performed by: NURSE PRACTITIONER

## 2023-09-12 PROCEDURE — 3074F PR MOST RECENT SYSTOLIC BLOOD PRESSURE < 130 MM HG: ICD-10-PCS | Mod: HCNC,CPTII,S$GLB, | Performed by: NURSE PRACTITIONER

## 2023-09-12 PROCEDURE — 3288F FALL RISK ASSESSMENT DOCD: CPT | Mod: HCNC,CPTII,S$GLB, | Performed by: NURSE PRACTITIONER

## 2023-09-12 PROCEDURE — 99214 PR OFFICE/OUTPT VISIT, EST, LEVL IV, 30-39 MIN: ICD-10-PCS | Mod: HCNC,S$GLB,, | Performed by: NURSE PRACTITIONER

## 2023-09-12 PROCEDURE — 99999 PR PBB SHADOW E&M-EST. PATIENT-LVL III: CPT | Mod: PBBFAC,HCNC,, | Performed by: NURSE PRACTITIONER

## 2023-09-12 PROCEDURE — 1159F MED LIST DOCD IN RCRD: CPT | Mod: HCNC,CPTII,S$GLB, | Performed by: NURSE PRACTITIONER

## 2023-09-12 PROCEDURE — 85025 COMPLETE CBC W/AUTO DIFF WBC: CPT | Mod: HCNC | Performed by: NURSE PRACTITIONER

## 2023-09-12 PROCEDURE — 3078F DIAST BP <80 MM HG: CPT | Mod: HCNC,CPTII,S$GLB, | Performed by: NURSE PRACTITIONER

## 2023-09-12 PROCEDURE — 84480 ASSAY TRIIODOTHYRONINE (T3): CPT | Mod: HCNC | Performed by: NURSE PRACTITIONER

## 2023-09-12 PROCEDURE — 1101F PT FALLS ASSESS-DOCD LE1/YR: CPT | Mod: HCNC,CPTII,S$GLB, | Performed by: NURSE PRACTITIONER

## 2023-09-12 PROCEDURE — 3074F SYST BP LT 130 MM HG: CPT | Mod: HCNC,CPTII,S$GLB, | Performed by: NURSE PRACTITIONER

## 2023-09-12 PROCEDURE — 99999 PR PBB SHADOW E&M-EST. PATIENT-LVL III: ICD-10-PCS | Mod: PBBFAC,HCNC,, | Performed by: NURSE PRACTITIONER

## 2023-09-12 PROCEDURE — 1160F RVW MEDS BY RX/DR IN RCRD: CPT | Mod: HCNC,CPTII,S$GLB, | Performed by: NURSE PRACTITIONER

## 2023-09-12 PROCEDURE — 36415 COLL VENOUS BLD VENIPUNCTURE: CPT | Mod: HCNC,PO | Performed by: NURSE PRACTITIONER

## 2023-09-12 RX ORDER — ALBUTEROL SULFATE 90 UG/1
2 AEROSOL, METERED RESPIRATORY (INHALATION) EVERY 6 HOURS PRN
Qty: 18 G | Refills: 1 | Status: SHIPPED | OUTPATIENT
Start: 2023-09-12 | End: 2023-12-11

## 2023-09-12 NOTE — PROGRESS NOTES
Rigoberto Perez  09/13/2023  [id number]    Trinidad Bai MD  Patient Care Team:  Trinidad Bai MD as PCP - General (Internal Medicine)  Dwight Rubio MD as Consulting Physician (Otolaryngology)  Joan Cadena, NP as Nurse Practitioner (Family Medicine)  Alice Ruiz NP as Nurse Practitioner (Family Medicine)      [facility] Note      Chief Complaint:  Chief Complaint   Patient presents with    Follow-up     Three month follow up. No issues or concerns.         History of Present Illness:  HPI  Amlodipine dose decreased 7/31 due to orthostasis. Doesn't check BP at home.  Still episodes of dizziness, most recently about an hour ago pushing spouse in w/c. Occurs when he stands up too quickly. Dizziness is brief, relieved by rest/deep breathing. Becomes weak, room doesn't spin.     BP Readings from Last 3 Encounters:   09/12/23 (!) 114/54   07/31/23 (!) 122/56   06/27/23 (!) 131/57   Increased sputum production. Using Trelegy.   Occasional dyspnea, more frequent than previous.      Continues to lose weight. 6 lb loss in 6 months.   Spouse concerned about his poor appetite.   Lab Results   Component Value Date    TSH 3.360 09/12/2023     No heartburn/dyspepsia.       Review of Systems   Constitutional:  Negative for activity change, appetite change, fatigue and fever.   HENT:  Negative for congestion.    Respiratory:  Positive for cough. Negative for chest tightness, shortness of breath and wheezing.    Cardiovascular:  Negative for chest pain and palpitations.   Gastrointestinal:  Negative for abdominal pain, constipation, diarrhea and vomiting.   Genitourinary:  Negative for dysuria.   Musculoskeletal:  Negative for arthralgias and myalgias.   Neurological:  Positive for dizziness.         The following were reviewed: Active problem list, medication list, allergies, family history, social history, and Health Maintenance.       Medications:  Current Outpatient Medications on  File Prior to Visit   Medication Sig Dispense Refill    aspirin (ECOTRIN) 81 MG EC tablet Take 1 tablet (81 mg total) by mouth once daily. 30 tablet 5    azelastine (ASTELIN) 137 mcg (0.1 %) nasal spray 1 spray (137 mcg total) by Nasal route 2 (two) times daily as needed for Rhinitis (sneezing). 30 mL 11    blood pressure monitor Kit 1 kit by Misc.(Non-Drug; Combo Route) route once daily. 1 each 0    cholecalciferol, vitamin D3, (VITAMIN D3) 25 mcg (1,000 unit) capsule Take 2 capsules (2,000 Units total) by mouth once daily. 180 capsule 1    folic acid (FOLVITE) 1 MG tablet Take 1 tablet (1 mg total) by mouth once daily. 90 tablet 1    nebulizer and compressor Ivon Use as directed      rosuvastatin (CRESTOR) 10 MG tablet Take 1 tablet (10 mg total) by mouth once daily. 30 tablet 5    TRELEGY ELLIPTA 100-62.5-25 mcg DsDv Inhale 1 puff into the lungs once daily. 30 each 5    clotrimazole (LOTRIMIN) 1 % cream Apply topically 2 (two) times daily. 24 g 1     No current facility-administered medications on file prior to visit.       Medications have been reviewed and reconciled with patient at visit today.    Barriers to medications present (no )    Fall since last office visit (no )      Exam:  Vitals:    09/12/23 1453   BP: (!) 114/54   Pulse: 75   Temp: 97.4 °F (36.3 °C)     Weight: 48.2 kg (106 lb 4.8 oz)   Body mass index is 15.25 kg/m².      BP Readings from Last 3 Encounters:   09/12/23 (!) 114/54   07/31/23 (!) 122/56   06/27/23 (!) 131/57     Wt Readings from Last 3 Encounters:   09/12/23 1453 48.2 kg (106 lb 4.8 oz)   07/31/23 1124 49.2 kg (108 lb 8 oz)   06/27/23 1211 49.6 kg (109 lb 5.6 oz)            Physical Exam    Laboratory Reviewed: (Yes)  Lab Results   Component Value Date    WBC 6.03 09/12/2023    HGB 11.0 (L) 09/12/2023    HCT 35.5 (L) 09/12/2023     (L) 09/12/2023    CHOL 129 06/06/2023    TRIG 56 06/06/2023    HDL 53 06/06/2023    ALT 14 06/06/2023    AST 24 06/06/2023     06/06/2023     K 5.1 06/06/2023     06/06/2023    CREATININE 1.3 06/06/2023    BUN 21 06/06/2023    CO2 23 06/06/2023    TSH 3.360 09/12/2023    HGBA1C 5.3 06/06/2023           Health Maintenance  Health Maintenance Topics with due status: Not Due       Topic Last Completion Date    Hemoglobin A1c (Prediabetes) 06/06/2023    Lipid Panel 06/06/2023     Health Maintenance Due   Topic Date Due    TETANUS VACCINE  Never done    Shingles Vaccine (1 of 2) Never done    COVID-19 Vaccine (4 - Pfizer series) 04/08/2023    Influenza Vaccine (1) 09/01/2023           Assessment:  Problem List Items Addressed This Visit          Pulmonary    Chronic obstructive pulmonary disease     Some increased sputum production, clear. Encouraged to use albuterol PRN, he is not using currently.   Continue Trellegy.          Relevant Medications    albuterol (PROAIR HFA) 90 mcg/actuation inhaler       Cardiac/Vascular    Congestive heart failure     Noted on [facility]HO 2018.   Not on current tx. Consider repeating ECHO.            Endocrine    Underweight     Slight weight loss noted but minimal change in the past year.  Repeat TSH, free T4, T3 and CBC.            Other Visit Diagnoses       Weight loss    -  Primary    Relevant Orders    TSH (Completed)    T4, FREE (Completed)    T3 (Completed)    CBC Auto Differential (Completed)    Scalp cyst                  Plan:  Weight loss  -     TSH; Future; Expected date: 09/12/2023  -     T4, FREE; Future; Expected date: 09/12/2023  -     T3; Future; Expected date: 09/12/2023  -     CBC Auto Differential; Future; Expected date: 09/12/2023    Scalp cyst    Centrilobular emphysema  -     albuterol (PROAIR HFA) 90 mcg/actuation inhaler; Inhale 2 puffs into the lungs every 6 (six) hours as needed for Wheezing. Rescue  Dispense: 18 g; Refill: 1    Combined systolic and diastolic congestive heart failure, unspecified HF chronicity    Underweight      -Patient's lab results were reviewed and discussed with  patient  -Treatment options and alternatives were discussed with the patient. Patient expressed understanding. Patient was given the opportunity to ask questions and be an active participant in their medical care. Patient had no further questions or concerns at this time.   -Documentation of patient's health and condition was obtained from family member who was present during visit.  -Patient is an overall moderate risk for health complications from their medical conditions.       Follow up: Follow up in about 4 weeks (around 10/10/2023) for weight loss, COPD.      After visit summary printed and given to patient upon discharge.  Patient goals and care plan are included in After visit summary.    Total medical decision making time was 34 min.  The following issues were discussed: The primary encounter diagnosis was Weight loss. Diagnoses of Scalp cyst, Centrilobular emphysema, Combined systolic and diastolic congestive heart failure, unspecified HF chronicity, and Underweight were also pertinent to this visit.    Health maintenance needs, recent test results and goals of care discussed with pt and questions answered.

## 2023-09-13 LAB
T3 SERPL-MCNC: 75 NG/DL (ref 60–180)
T4 FREE SERPL-MCNC: 0.9 NG/DL (ref 0.71–1.51)
TSH SERPL DL<=0.005 MIU/L-ACNC: 3.36 UIU/ML (ref 0.4–4)

## 2023-09-13 NOTE — ASSESSMENT & PLAN NOTE
Some increased sputum production, clear. Encouraged to use albuterol PRN, he is not using currently.   Continue Delon.

## 2023-10-24 DIAGNOSIS — E78.5 HYPERLIPIDEMIA, UNSPECIFIED HYPERLIPIDEMIA TYPE: ICD-10-CM

## 2023-10-24 RX ORDER — ROSUVASTATIN CALCIUM 10 MG/1
10 TABLET, COATED ORAL DAILY
Qty: 30 TABLET | Refills: 5 | Status: SHIPPED | OUTPATIENT
Start: 2023-10-24 | End: 2024-04-21

## 2023-10-30 ENCOUNTER — OFFICE VISIT (OUTPATIENT)
Dept: PRIMARY CARE CLINIC | Facility: CLINIC | Age: 83
End: 2023-10-30
Payer: MEDICARE

## 2023-10-30 VITALS
OXYGEN SATURATION: 100 % | SYSTOLIC BLOOD PRESSURE: 116 MMHG | BODY MASS INDEX: 15.58 KG/M2 | HEIGHT: 70 IN | DIASTOLIC BLOOD PRESSURE: 58 MMHG | HEART RATE: 95 BPM | WEIGHT: 108.81 LBS | TEMPERATURE: 98 F

## 2023-10-30 DIAGNOSIS — J43.2 CENTRILOBULAR EMPHYSEMA: ICD-10-CM

## 2023-10-30 DIAGNOSIS — I15.0 RENOVASCULAR HYPERTENSION: Chronic | ICD-10-CM

## 2023-10-30 PROCEDURE — 99999 PR PBB SHADOW E&M-EST. PATIENT-LVL IV: CPT | Mod: PBBFAC,HCNC,, | Performed by: NURSE PRACTITIONER

## 2023-10-30 PROCEDURE — 3074F SYST BP LT 130 MM HG: CPT | Mod: HCNC,CPTII,S$GLB, | Performed by: NURSE PRACTITIONER

## 2023-10-30 PROCEDURE — 1101F PT FALLS ASSESS-DOCD LE1/YR: CPT | Mod: HCNC,CPTII,S$GLB, | Performed by: NURSE PRACTITIONER

## 2023-10-30 PROCEDURE — G0008 ADMIN INFLUENZA VIRUS VAC: HCPCS | Mod: HCNC,S$GLB,, | Performed by: INTERNAL MEDICINE

## 2023-10-30 PROCEDURE — 1126F AMNT PAIN NOTED NONE PRSNT: CPT | Mod: HCNC,CPTII,S$GLB, | Performed by: NURSE PRACTITIONER

## 2023-10-30 PROCEDURE — 3078F DIAST BP <80 MM HG: CPT | Mod: HCNC,CPTII,S$GLB, | Performed by: NURSE PRACTITIONER

## 2023-10-30 PROCEDURE — 90694 FLU VACCINE - QUADRIVALENT - ADJUVANTED: ICD-10-PCS | Mod: HCNC,S$GLB,, | Performed by: INTERNAL MEDICINE

## 2023-10-30 PROCEDURE — 3288F FALL RISK ASSESSMENT DOCD: CPT | Mod: HCNC,CPTII,S$GLB, | Performed by: NURSE PRACTITIONER

## 2023-10-30 PROCEDURE — 99212 PR OFFICE/OUTPT VISIT, EST, LEVL II, 10-19 MIN: ICD-10-PCS | Mod: HCNC,25,S$GLB, | Performed by: NURSE PRACTITIONER

## 2023-10-30 PROCEDURE — 1160F RVW MEDS BY RX/DR IN RCRD: CPT | Mod: HCNC,CPTII,S$GLB, | Performed by: NURSE PRACTITIONER

## 2023-10-30 PROCEDURE — 3078F PR MOST RECENT DIASTOLIC BLOOD PRESSURE < 80 MM HG: ICD-10-PCS | Mod: HCNC,CPTII,S$GLB, | Performed by: NURSE PRACTITIONER

## 2023-10-30 PROCEDURE — G0008 FLU VACCINE - QUADRIVALENT - ADJUVANTED: ICD-10-PCS | Mod: HCNC,S$GLB,, | Performed by: INTERNAL MEDICINE

## 2023-10-30 PROCEDURE — 1126F PR PAIN SEVERITY QUANTIFIED, NO PAIN PRESENT: ICD-10-PCS | Mod: HCNC,CPTII,S$GLB, | Performed by: NURSE PRACTITIONER

## 2023-10-30 PROCEDURE — 90694 VACC AIIV4 NO PRSRV 0.5ML IM: CPT | Mod: HCNC,S$GLB,, | Performed by: INTERNAL MEDICINE

## 2023-10-30 PROCEDURE — 3288F PR FALLS RISK ASSESSMENT DOCUMENTED: ICD-10-PCS | Mod: HCNC,CPTII,S$GLB, | Performed by: NURSE PRACTITIONER

## 2023-10-30 PROCEDURE — 99999 PR PBB SHADOW E&M-EST. PATIENT-LVL IV: ICD-10-PCS | Mod: PBBFAC,HCNC,, | Performed by: NURSE PRACTITIONER

## 2023-10-30 PROCEDURE — 3074F PR MOST RECENT SYSTOLIC BLOOD PRESSURE < 130 MM HG: ICD-10-PCS | Mod: HCNC,CPTII,S$GLB, | Performed by: NURSE PRACTITIONER

## 2023-10-30 PROCEDURE — 1159F PR MEDICATION LIST DOCUMENTED IN MEDICAL RECORD: ICD-10-PCS | Mod: HCNC,CPTII,S$GLB, | Performed by: NURSE PRACTITIONER

## 2023-10-30 PROCEDURE — 99212 OFFICE O/P EST SF 10 MIN: CPT | Mod: HCNC,25,S$GLB, | Performed by: NURSE PRACTITIONER

## 2023-10-30 PROCEDURE — 1101F PR PT FALLS ASSESS DOC 0-1 FALLS W/OUT INJ PAST YR: ICD-10-PCS | Mod: HCNC,CPTII,S$GLB, | Performed by: NURSE PRACTITIONER

## 2023-10-30 PROCEDURE — 1160F PR REVIEW ALL MEDS BY PRESCRIBER/CLIN PHARMACIST DOCUMENTED: ICD-10-PCS | Mod: HCNC,CPTII,S$GLB, | Performed by: NURSE PRACTITIONER

## 2023-10-30 PROCEDURE — 1159F MED LIST DOCD IN RCRD: CPT | Mod: HCNC,CPTII,S$GLB, | Performed by: NURSE PRACTITIONER

## 2023-10-30 NOTE — PROGRESS NOTES
Rigoberto Perez  11/01/2023  [id number]    Trinidad Bai MD  Patient Care Team:  Trinidad Bai MD as PCP - General (Internal Medicine)  Dwight Rubio MD as Consulting Physician (Otolaryngology)  Joan Cadena NP as Nurse Practitioner (Family Medicine)  Alice Ruiz NP as Nurse Practitioner (Family Medicine)      [facility] Note      Chief Complaint:  Chief Complaint   Patient presents with    Follow-up       History of Present Illness:  HPI    Stopped amlodipine 2 weeks. No dizziness since stopping. No dyspnea, no edema. Uses albuterol PRN, typically 1-2 times weekly. No cough.     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       Review of Systems   Constitutional:  Negative for activity change and appetite change.   Respiratory:  Negative for cough (at baseline), chest tightness and shortness of breath (at baseline).    Gastrointestinal:  Negative for abdominal pain, constipation, diarrhea and nausea.   Genitourinary:  Negative for difficulty urinating and dysuria.   Neurological:  Negative for dizziness and headaches.         The following were reviewed: Active problem list, medication list, allergies, family history, social history, and Health Maintenance.       Medications:  Current Outpatient Medications on File Prior to Visit   Medication Sig Dispense Refill    albuterol (PROAIR HFA) 90 mcg/actuation inhaler Inhale 2 puffs into the lungs every 6 (six) hours as needed for Wheezing. Rescue 18 g 1    aspirin (ECOTRIN) 81 MG EC tablet Take 1 tablet (81 mg total) by mouth once daily. 30 tablet 5    azelastine (ASTELIN) 137 mcg (0.1 %) nasal spray 1 spray (137 mcg total) by Nasal route 2 (two) times daily as needed for Rhinitis (sneezing). 30 mL 11    blood pressure monitor Kit 1 kit by Misc.(Non-Drug; Combo Route) route once daily. 1 each 0     cholecalciferol, vitamin D3, (VITAMIN D3) 25 mcg (1,000 unit) capsule Take 2 capsules (2,000 Units total) by mouth once daily. 180 capsule 1    folic acid (FOLVITE) 1 MG tablet Take 1 tablet (1 mg total) by mouth once daily. 90 tablet 1    nebulizer and compressor Ivon Use as directed      rosuvastatin (CRESTOR) 10 MG tablet Take 1 tablet (10 mg total) by mouth once daily. 30 tablet 5    clotrimazole (LOTRIMIN) 1 % cream Apply topically 2 (two) times daily. 24 g 1     No current facility-administered medications on file prior to visit.       Medications have been reviewed and reconciled with patient at visit today.    Barriers to medications present (no )    Fall since last office visit (no )      Exam:  Vitals:    10/30/23 1445   BP: (!) 116/58   Pulse: 95   Temp: 98.2 °F (36.8 °C)     Weight: 49.4 kg (108 lb 12.8 oz)   Body mass index is 15.61 kg/m².      BP Readings from Last 3 Encounters:   10/30/23 (!) 116/58   10/16/23 (!) 118/54   09/12/23 (!) 114/54     Wt Readings from Last 3 Encounters:   10/30/23 1445 49.4 kg (108 lb 12.8 oz)   10/16/23 1438 48.7 kg (107 lb 4.8 oz)   09/12/23 1453 48.2 kg (106 lb 4.8 oz)            Physical Exam  Constitutional:       General: He is not in acute distress.     Comments: Thin, frail elderly male   HENT:      Right Ear: Tympanic membrane normal. There is no impacted cerumen.      Left Ear: Tympanic membrane normal. There is no impacted cerumen.      Mouth/Throat:      Mouth: Mucous membranes are moist.      Pharynx: No oropharyngeal exudate or posterior oropharyngeal erythema.   Eyes:      General: No scleral icterus.  Cardiovascular:      Rate and Rhythm: Normal rate and regular rhythm.   Pulmonary:      Effort: No respiratory distress.      Breath sounds: Normal breath sounds.   Abdominal:      General: There is no distension.      Palpations: Abdomen is soft.      Tenderness: There is no abdominal tenderness.   Musculoskeletal:         General: No swelling.      Cervical  back: No tenderness.   Lymphadenopathy:      Cervical: No cervical adenopathy.   Skin:     General: Skin is warm.   Neurological:      Mental Status: He is alert and oriented to person, place, and time. Mental status is at baseline.      Gait: Gait normal.   Psychiatric:         Mood and Affect: Mood normal.         Behavior: Behavior normal.         Laboratory Reviewed: (Yes)  Lab Results   Component Value Date    WBC 6.03 09/12/2023    HGB 11.0 (L) 09/12/2023    HCT 35.5 (L) 09/12/2023     (L) 09/12/2023    CHOL 129 06/06/2023    TRIG 56 06/06/2023    HDL 53 06/06/2023    ALT 14 06/06/2023    AST 24 06/06/2023     06/06/2023    K 5.1 06/06/2023     06/06/2023    CREATININE 1.3 06/06/2023    BUN 21 06/06/2023    CO2 23 06/06/2023    TSH 3.360 09/12/2023    HGBA1C 5.3 06/06/2023           Health Maintenance  Health Maintenance Topics with due status: Not Due       Topic Last Completion Date    Hemoglobin A1c (Prediabetes) 06/06/2023    Lipid Panel 06/06/2023     Health Maintenance Due   Topic Date Due    TETANUS VACCINE  Never done    Shingles Vaccine (1 of 2) Never done    RSV Vaccine (Age 60+) (1 - 1-dose 60+ series) Never done    COVID-19 Vaccine (4 - 2023-24 season) 09/01/2023           Assessment:  Problem List Items Addressed This Visit          Pulmonary    Centrilobular emphysema     Stable. Continue POC.  Rarely uses rescue inhaler.             Cardiac/Vascular    Renovascular hypertension (Chronic)     BP Readings from Last 3 Encounters:   10/30/23 (!) 116/58   10/16/23 (!) 118/54   09/12/23 (!) 114/54   No further dizziness since stopping amlodipine.  Continue POC                  Plan:  Renovascular hypertension    Centrilobular emphysema    Other orders  -     Influenza - Quadrivalent (Adjuvanted)      -Patient's lab results were reviewed and discussed with patient  -Treatment options and alternatives were discussed with the patient. Patient expressed understanding. Patient was given  the opportunity to ask questions and be an active participant in their medical care. Patient had no further questions or concerns at this time.   -Documentation of patient's health and condition was obtained from family member who was present during visit.  -Patient is an overall moderate risk for health complications from their medical conditions.       Follow up: No follow-ups on file.      After visit summary printed and given to patient upon discharge.  Patient goals and care plan are included in After visit summary.    Total medical decision making time was 12 min.  The following issues were discussed: Diagnoses of Renovascular hypertension and Centrilobular emphysema were pertinent to this visit.    Health maintenance needs, recent test results and goals of care discussed with pt and questions answered.

## 2023-10-30 NOTE — PATIENT INSTRUCTIONS
If you are feeling unwell, we'd like to be the first ones to know here at [facility]! Please give us a call. Same day appointments are our top priority to keep you well and out of the emergency rooms and hospitals. Call [phone] for our direct line. After hours advice is always available. Please call [phone] after hours to speak to the on-call team.

## 2023-11-01 NOTE — ASSESSMENT & PLAN NOTE
BP Readings from Last 3 Encounters:   10/30/23 (!) 116/58   10/16/23 (!) 118/54   09/12/23 (!) 114/54   No further dizziness since stopping amlodipine.  Continue POC

## 2023-12-01 ENCOUNTER — OFFICE VISIT (OUTPATIENT)
Dept: PRIMARY CARE CLINIC | Facility: CLINIC | Age: 83
End: 2023-12-01
Payer: MEDICARE

## 2023-12-01 ENCOUNTER — PATIENT OUTREACH (OUTPATIENT)
Dept: ADMINISTRATIVE | Facility: OTHER | Age: 83
End: 2023-12-01
Payer: MEDICARE

## 2023-12-01 VITALS
DIASTOLIC BLOOD PRESSURE: 56 MMHG | HEART RATE: 66 BPM | WEIGHT: 107.5 LBS | BODY MASS INDEX: 15.39 KG/M2 | TEMPERATURE: 98 F | SYSTOLIC BLOOD PRESSURE: 122 MMHG | OXYGEN SATURATION: 99 % | HEIGHT: 70 IN

## 2023-12-01 DIAGNOSIS — J43.2 CENTRILOBULAR EMPHYSEMA: ICD-10-CM

## 2023-12-01 DIAGNOSIS — Z85.828 HISTORY OF BASAL CELL CANCER: ICD-10-CM

## 2023-12-01 DIAGNOSIS — I50.40 COMBINED SYSTOLIC AND DIASTOLIC CONGESTIVE HEART FAILURE, UNSPECIFIED HF CHRONICITY: ICD-10-CM

## 2023-12-01 DIAGNOSIS — I95.1 ORTHOSTASIS: ICD-10-CM

## 2023-12-01 DIAGNOSIS — F17.218 NICOTINE DEPENDENCE, CIGARETTES, WITH OTHER NICOTINE-INDUCED DISORDERS: Chronic | ICD-10-CM

## 2023-12-01 DIAGNOSIS — I25.10 CORONARY ARTERY DISEASE INVOLVING NATIVE CORONARY ARTERY OF NATIVE HEART WITHOUT ANGINA PECTORIS: Primary | ICD-10-CM

## 2023-12-01 DIAGNOSIS — H61.23 BILATERAL IMPACTED CERUMEN: Chronic | ICD-10-CM

## 2023-12-01 PROBLEM — I15.0 RENOVASCULAR HYPERTENSION: Chronic | Status: RESOLVED | Noted: 2022-10-28 | Resolved: 2023-12-01

## 2023-12-01 PROCEDURE — 3074F SYST BP LT 130 MM HG: CPT | Mod: HCNC,CPTII,S$GLB, | Performed by: INTERNAL MEDICINE

## 2023-12-01 PROCEDURE — 99215 OFFICE O/P EST HI 40 MIN: CPT | Mod: HCNC,S$GLB,, | Performed by: INTERNAL MEDICINE

## 2023-12-01 PROCEDURE — 3288F PR FALLS RISK ASSESSMENT DOCUMENTED: ICD-10-PCS | Mod: HCNC,CPTII,S$GLB, | Performed by: INTERNAL MEDICINE

## 2023-12-01 PROCEDURE — 3288F FALL RISK ASSESSMENT DOCD: CPT | Mod: HCNC,CPTII,S$GLB, | Performed by: INTERNAL MEDICINE

## 2023-12-01 PROCEDURE — 1101F PT FALLS ASSESS-DOCD LE1/YR: CPT | Mod: HCNC,CPTII,S$GLB, | Performed by: INTERNAL MEDICINE

## 2023-12-01 PROCEDURE — 3078F PR MOST RECENT DIASTOLIC BLOOD PRESSURE < 80 MM HG: ICD-10-PCS | Mod: HCNC,CPTII,S$GLB, | Performed by: INTERNAL MEDICINE

## 2023-12-01 PROCEDURE — 1159F MED LIST DOCD IN RCRD: CPT | Mod: HCNC,CPTII,S$GLB, | Performed by: INTERNAL MEDICINE

## 2023-12-01 PROCEDURE — 1159F PR MEDICATION LIST DOCUMENTED IN MEDICAL RECORD: ICD-10-PCS | Mod: HCNC,CPTII,S$GLB, | Performed by: INTERNAL MEDICINE

## 2023-12-01 PROCEDURE — 99215 PR OFFICE/OUTPT VISIT, EST, LEVL V, 40-54 MIN: ICD-10-PCS | Mod: HCNC,S$GLB,, | Performed by: INTERNAL MEDICINE

## 2023-12-01 PROCEDURE — 1101F PR PT FALLS ASSESS DOC 0-1 FALLS W/OUT INJ PAST YR: ICD-10-PCS | Mod: HCNC,CPTII,S$GLB, | Performed by: INTERNAL MEDICINE

## 2023-12-01 PROCEDURE — 3074F PR MOST RECENT SYSTOLIC BLOOD PRESSURE < 130 MM HG: ICD-10-PCS | Mod: HCNC,CPTII,S$GLB, | Performed by: INTERNAL MEDICINE

## 2023-12-01 PROCEDURE — 99999 PR PBB SHADOW E&M-EST. PATIENT-LVL IV: CPT | Mod: PBBFAC,HCNC,, | Performed by: INTERNAL MEDICINE

## 2023-12-01 PROCEDURE — 99999 PR PBB SHADOW E&M-EST. PATIENT-LVL IV: ICD-10-PCS | Mod: PBBFAC,HCNC,, | Performed by: INTERNAL MEDICINE

## 2023-12-01 PROCEDURE — 3078F DIAST BP <80 MM HG: CPT | Mod: HCNC,CPTII,S$GLB, | Performed by: INTERNAL MEDICINE

## 2023-12-01 NOTE — PROGRESS NOTES
"Rigoberto Perez  12/01/2023  [id number]    Trinidad Bai MD  Patient Care Team:  Trinidad Bai MD as PCP - General (Internal Medicine)  Dwight Rubio MD as Consulting Physician (Otolaryngology)  Joan Cadena NP as Nurse Practitioner (Family Medicine)  Alice Ruiz NP as Nurse Practitioner (Family Medicine)  Jacobs, Kymeesha    Visit Type: Follow-up    Chief Complaint:  Chief Complaint   Patient presents with    Follow-up     One month follow up. No issues or concerns.      History of Present Illness: Mr. Rigoberto Perez is an 83 year old male here for scheduled f/u. Here again today with his wife who is also being seen by me today.      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      No current complaints  Has been able to cut back from 3 packs daily to now only about 3 cigarettes daily    From last visit w me 7/31/23  C/o feeling "weak" particularly when moving from lying or sitting to stand  Did not take BP medication - amlodipine 5 mg - yet this morning  Still smoking about 1/2 pack day - has been smoking 70 years - hard to quit  Declines referral to smoking cessation - says patches didn't work    Recent appointments:   10/30/23 [facility]   9/12/23 [facility]   7/31/23 [facility]  6/27/23 Pulm [place]  6/12/23 Pod [facility]  6/06/23 [facility]  5/22/23 Pulm [place] establish care  4/27/23 [facility]   3/08/23 Ophthal [facility] f/u 1 yr    Upcoming appointments:  Future Appointments       Date Provider Specialty Appt Notes    12/8/2023 Dana Puente MD Dermatology History of basal cell cancer [Z85.828]    12/19/2023 Violet Pacheco PA-C Pulmonology copd 6 month f/u    12/21/2023 Brooke Choi MD Cardiology Orthostasis [I95.1]; Combined systolic and diastolic congestive heart " failure, unspecified HF chronicity [I50.40]; Coronary artery disease involving native coronary artery of native heart without angina pectoris [I25.10    1/4/2024 Alice Ruiz NP Primary Care One month f/u           The following were reviewed: Active problem list, medication list, allergies, family history, social history, and Health Maintenance.     Medications have been reviewed and reconciled with patient at visit today.    Review of Systems   See HPI above    Exam: sats 99% RA  Vitals:    12/01/23 1433   BP: (!) 122/56   Pulse: 66   Temp: 98 °F (36.7 °C)     Weight: 48.8 kg (107 lb 8 oz)   Body mass index is 15.42 kg/m².    BP Readings from Last 3 Encounters:   12/01/23 (!) 122/56   10/30/23 (!) 116/58   10/16/23 (!) 118/54      Wt Readings from Last 3 Encounters:   12/01/23 1433 48.8 kg (107 lb 8 oz)   10/30/23 1445 49.4 kg (108 lb 12.8 oz)   10/16/23 1438 48.7 kg (107 lb 4.8 oz)      Physical Exam  Vitals reviewed.   Constitutional:       General: He is not in acute distress.     Appearance: Normal appearance. He is ill-appearing.      Comments: underweight   HENT:      Head: Normocephalic and atraumatic.      Right Ear: External ear normal. There is impacted cerumen.      Left Ear: External ear normal.      Ears:      Comments: Partial impaction on L  [place]     Nose: Nose normal.      Mouth/Throat:      Mouth: Mucous membranes are moist.      Pharynx: Oropharynx is clear.      Comments: Plate on top - only few teeth remaining bottom jaw  Eyes:      General: No scleral icterus.     Extraocular Movements: Extraocular movements intact.      Conjunctiva/sclera: Conjunctivae normal.      Comments: glasses   Neck:      Vascular: No carotid bruit.   Cardiovascular:      Rate and Rhythm: Normal rate and regular rhythm.      Heart sounds: Murmur heard.      Comments: distant  Pulmonary:      Effort: Pulmonary effort is normal. No respiratory distress.      Breath sounds: No wheezing, rhonchi or rales.       Comments: Diminished air movement  Intermittent dry non-productive cough  Abdominal:      General: Bowel sounds are normal.      Palpations: Abdomen is soft.      Tenderness: There is no abdominal tenderness. There is no guarding.   Musculoskeletal:      Right lower leg: No edema.      Left lower leg: No edema.   Lymphadenopathy:      Cervical: No cervical adenopathy.   Skin:     General: Skin is warm and dry.      Findings: Bruising and lesion present.   Neurological:      General: No focal deficit present.      Mental Status: He is alert. Mental status is at baseline.      Coordination: Coordination normal.      Gait: Gait abnormal.   Psychiatric:         Mood and Affect: Mood normal.         Behavior: Behavior normal.      Laboratory Reviewed  Lab Results   Component Value Date    WBC 6.03 09/12/2023    HGB 11.0 (L) 09/12/2023    HCT 35.5 (L) 09/12/2023     (L) 09/12/2023    MCV 97 09/12/2023    CHOL 129 06/06/2023    TRIG 56 06/06/2023    HDL 53 06/06/2023    LDLCALC 64.8 06/06/2023    ALT 14 06/06/2023    AST 24 06/06/2023     06/06/2023    K 5.1 06/06/2023     06/06/2023    CREATININE 1.3 06/06/2023    BUN 21 06/06/2023    CO2 23 06/06/2023    MG 2.0 08/29/2022    TSH 3.360 09/12/2023    FREET4 0.90 09/12/2023    HGBA1C 5.3 06/06/2023     Lab Results   Component Value Date    PTH 75.9 06/06/2023    CALCIUM 10.4 06/06/2023    PHOS 3.8 08/29/2022      Lab Results   Component Value Date    [id number] 06/06/2023     Lab Results   Component Value Date    FOLATE 4.7 06/06/2023      Lab Results   Component Value Date    IRON 73 06/06/2023    TRANSFERRIN 229 06/06/2023    TIBC 339 06/06/2023    FESATURATED 22 06/06/2023      Lab Results   Component Value Date    EGFRNORACEVR 54.5 (A) 06/06/2023    ALBUMIN 4.5 06/06/2023     (H) 06/26/2022     Lab Results   Component Value Date    [id number] 24 (L) 06/06/2023      US AAA Screen 6/23/23 Atherosclerosis with no evidence of abdominal aortic  aneurysm     HILDA 8/29/22 Rt hilda and waveforms suggestive of mild disease.  Left hilda and waveforms suggestive of moderate disease.    Assessment:   83 y.o. male with multiple co-morbid illnesses here for continued follow up of medical problems.      The primary encounter diagnosis was Coronary artery disease involving native coronary artery of native heart without angina pectoris. Diagnoses of Orthostasis, Combined systolic and diastolic congestive heart failure, unspecified HF chronicity, History of basal cell cancer, Bilateral impacted cerumen, Centrilobular emphysema, and Nicotine dependence, cigarettes, with other nicotine-induced disorders were also pertinent to this visit.      Plan:   1. Coronary artery disease involving native coronary artery of native heart without angina pectoris  Assessment & Plan:  No recent cardiology eval - in agreement w referral to cardiology - wife would like him to see her cardiologist, Dr. Choi - [place] more convenient than [place]    Orders:  -     Ambulatory referral/consult to Cardiology; Future; Expected date: 12/08/2023    2. Orthostasis  Assessment & Plan:  Amlodipine discontinued - reports less dizziness/lightheadedness     Orders:  -     Ambulatory referral/consult to Cardiology; Future; Expected date: 12/08/2023    3. Combined systolic and diastolic congestive heart failure, unspecified HF chronicity  -     Ambulatory referral/consult to Cardiology; Future; Expected date: 12/08/2023    4. History of basal cell cancer  -     Ambulatory referral/consult to Dermatology; Future; Expected date: 12/08/2023    5. Bilateral impacted cerumen  Assessment & Plan:  Never used debrox - reordered debrox - recheck ears next f/u       6. Centrilobular emphysema  Overview:  CT Chest 2021 The lungs are hyperinflated with centrilobular and paraseptal emphysema.     Assessment & Plan:  F/u Pulm as scheduled - encouraged cont efforts to cut back on smoking      7. Nicotine dependence,  cigarettes, with other nicotine-induced disorders  Assessment & Plan:  Has cut back significantly - from 3 packs daily to 3 cigarettes daily. Dangers of cigarette smoking were reviewed again with patient. Congratulated on success so far in cutting back!       Other orders  -     carbamide peroxide (DEBROX) 6.5 % otic solution; Place 5 drops into both ears 2 (two) times daily. For 4 days then continue w 5 drops each ear once weekly  Dispense: 15 mL; Refill: 2         Health Maintenance         Date Due Completion Date    TETANUS VACCINE Never done ---    Shingles Vaccine (1 of 2) Never done ---    RSV Vaccine (Age 60+ and Pregnant patients) (1 - 1-dose 60+ series) Never done ---    COVID-19 Vaccine (4 - 2023-24 season) 09/01/2023 12/8/2022    Hemoglobin A1c (Prediabetes) 06/06/2024 6/6/2023    Lipid Panel 06/06/2024 6/6/2023          -Patient's lab results were reviewed and discussed with patient  -Treatment options and alternatives were discussed with the patient. Patient expressed understanding. Patient was given the opportunity to ask questions and be an active participant in their medical care. Patient had no further questions or concerns at this time.   familywithpt: -Documentation of patient's health and condition was obtained from family member who was present during visit.   -Patient is an overall moderate to HIGH risk for health complications from their medical conditions.     Follow up: Follow up in about 4 weeks (around 12/29/2023) for Follow Up w Alice or me.    Care Plan/Goals: Reviewed No   Goals         Gain weight (pt-stated)       Achievable - within patient's control: yes  Maybe    Difficulties Identified: chronic medical conditions    Plan for Overcoming difficulties: nutritional supplements    Timeframe for completion: 6 mos    Update 10/28/22: 3 lbs weight gain - has cut back smoking                    After visit summary printed and given to patient upon discharge.  Patient goals and care plan  are included in After visit summary.    TOTAL TIME evaluating and managing this patient for this encounter was greater than 60 minutes. This time was spent personally by me on some of the following activities: review of patient's past medical history, assessing age-appropriate health maintenance needs, review of any interval history, review and interpretation of lab results, review and interpretation of imaging test results, review and interpretation of cardiology test results, reviewing consulting specialist notes, obtaining history from the patient and family, examination of the patient, medication reconciliation, managing and/or ordering prescription medications, ordering imaging tests, ordering referral to subspecialty provider(s), educating patient and answering their questions about diagnosis, treatment plan, and goals of treatment, discussing planned follow-up and final documentation of the visit. This time was exclusive of any separately billable procedures for this patient and exclusive of time spent treating any other patients.

## 2023-12-01 NOTE — ASSESSMENT & PLAN NOTE
Has cut back significantly - from 3 packs daily to 3 cigarettes daily. Dangers of cigarette smoking were reviewed again with patient. Congratulated on success so far in cutting back!

## 2023-12-01 NOTE — PATIENT INSTRUCTIONS
If you are feeling unwell, we'd like to be the first ones to know here at [facility]! Please give us a call. Same day appointments are our top priority to keep you well and out of the emergency rooms and hospitals. Call [phone] for our direct line. After hours advice is always available. Please call [phone] after hours to speak to the on-call team.      Recommend Tetanus, Shingles, and Covid and RSV vaccines that can be scheduled at your pharmacy of choice.    Debrox 5 drops both ears 2x daily for 4 days  Then continue debrox 5 drops both ears once weekly

## 2023-12-01 NOTE — PROGRESS NOTES
CHW - Initial Contact    This Community Health Worker completed the Social Determinant of Health questionnaire with patient during clinic visit today.    Pt identified barriers of most importance are food insecurities.  Referrals to community agencies completed with patient consent outside of [facility] include: Pt wife received a SNAP application to help with food.  Referrals were put through [facility] - no  Support and Services: Food insecurities.  Other information discussed the patient needs help with: No other information was discussed the patient needs help with at this time.  Follow up required: No  No future outreach task assigned  SDOH was done on this patient. Please see note on SDOH.

## 2023-12-01 NOTE — ASSESSMENT & PLAN NOTE
No recent cardiology eval - in agreement w referral to cardiology - wife would like him to see her cardiologist, Dr. Choi - [place] more convenient than [place]

## 2023-12-21 ENCOUNTER — HOSPITAL ENCOUNTER (OUTPATIENT)
Dept: CARDIOLOGY | Facility: HOSPITAL | Age: 83
Discharge: HOME | End: 2023-12-21
Attending: STUDENT IN AN ORGANIZED HEALTH CARE EDUCATION/TRAINING PROGRAM
Payer: MEDICARE

## 2023-12-21 ENCOUNTER — OFFICE VISIT (OUTPATIENT)
Dept: CARDIOLOGY | Facility: CLINIC | Age: 83
End: 2023-12-21
Payer: MEDICARE

## 2023-12-21 VITALS
OXYGEN SATURATION: 99 % | HEART RATE: 71 BPM | BODY MASS INDEX: 15.15 KG/M2 | HEIGHT: 70 IN | SYSTOLIC BLOOD PRESSURE: 124 MMHG | DIASTOLIC BLOOD PRESSURE: 60 MMHG | WEIGHT: 105.81 LBS

## 2023-12-21 DIAGNOSIS — I95.1 ORTHOSTASIS: ICD-10-CM

## 2023-12-21 DIAGNOSIS — I25.10 CORONARY ARTERY DISEASE INVOLVING NATIVE CORONARY ARTERY OF NATIVE HEART WITHOUT ANGINA PECTORIS: Chronic | ICD-10-CM

## 2023-12-21 DIAGNOSIS — I50.40 COMBINED SYSTOLIC AND DIASTOLIC CONGESTIVE HEART FAILURE, UNSPECIFIED HF CHRONICITY: ICD-10-CM

## 2023-12-21 DIAGNOSIS — N18.32 STAGE 3B CHRONIC KIDNEY DISEASE: Primary | ICD-10-CM

## 2023-12-21 DIAGNOSIS — I25.10 CORONARY ARTERY DISEASE INVOLVING NATIVE CORONARY ARTERY OF NATIVE HEART WITHOUT ANGINA PECTORIS: ICD-10-CM

## 2023-12-21 DIAGNOSIS — I50.40 COMBINED SYSTOLIC AND DIASTOLIC CONGESTIVE HEART FAILURE, UNSPECIFIED HF CHRONICITY: Primary | ICD-10-CM

## 2023-12-21 DIAGNOSIS — J43.1 PANLOBULAR EMPHYSEMA: ICD-10-CM

## 2023-12-21 DIAGNOSIS — I77.9 CAROTID ARTERY DISEASE, UNSPECIFIED LATERALITY, UNSPECIFIED TYPE: ICD-10-CM

## 2023-12-21 DIAGNOSIS — E78.5 HYPERLIPIDEMIA, UNSPECIFIED HYPERLIPIDEMIA TYPE: ICD-10-CM

## 2023-12-21 PROCEDURE — 3074F SYST BP LT 130 MM HG: CPT | Mod: HCNC,CPTII,S$GLB, | Performed by: STUDENT IN AN ORGANIZED HEALTH CARE EDUCATION/TRAINING PROGRAM

## 2023-12-21 PROCEDURE — 1126F PR PAIN SEVERITY QUANTIFIED, NO PAIN PRESENT: ICD-10-PCS | Mod: HCNC,CPTII,S$GLB, | Performed by: STUDENT IN AN ORGANIZED HEALTH CARE EDUCATION/TRAINING PROGRAM

## 2023-12-21 PROCEDURE — 1101F PT FALLS ASSESS-DOCD LE1/YR: CPT | Mod: HCNC,CPTII,S$GLB, | Performed by: STUDENT IN AN ORGANIZED HEALTH CARE EDUCATION/TRAINING PROGRAM

## 2023-12-21 PROCEDURE — 99204 OFFICE O/P NEW MOD 45 MIN: CPT | Mod: HCNC,S$GLB,, | Performed by: STUDENT IN AN ORGANIZED HEALTH CARE EDUCATION/TRAINING PROGRAM

## 2023-12-21 PROCEDURE — 3074F PR MOST RECENT SYSTOLIC BLOOD PRESSURE < 130 MM HG: ICD-10-PCS | Mod: HCNC,CPTII,S$GLB, | Performed by: STUDENT IN AN ORGANIZED HEALTH CARE EDUCATION/TRAINING PROGRAM

## 2023-12-21 PROCEDURE — 99999 PR PBB SHADOW E&M-EST. PATIENT-LVL IV: ICD-10-PCS | Mod: PBBFAC,HCNC,, | Performed by: STUDENT IN AN ORGANIZED HEALTH CARE EDUCATION/TRAINING PROGRAM

## 2023-12-21 PROCEDURE — 93010 EKG 12-LEAD: ICD-10-PCS | Mod: HCNC,,, | Performed by: INTERNAL MEDICINE

## 2023-12-21 PROCEDURE — 93010 ELECTROCARDIOGRAM REPORT: CPT | Mod: HCNC,,, | Performed by: INTERNAL MEDICINE

## 2023-12-21 PROCEDURE — 99204 PR OFFICE/OUTPT VISIT, NEW, LEVL IV, 45-59 MIN: ICD-10-PCS | Mod: HCNC,S$GLB,, | Performed by: STUDENT IN AN ORGANIZED HEALTH CARE EDUCATION/TRAINING PROGRAM

## 2023-12-21 PROCEDURE — 1159F MED LIST DOCD IN RCRD: CPT | Mod: HCNC,CPTII,S$GLB, | Performed by: STUDENT IN AN ORGANIZED HEALTH CARE EDUCATION/TRAINING PROGRAM

## 2023-12-21 PROCEDURE — 3288F PR FALLS RISK ASSESSMENT DOCUMENTED: ICD-10-PCS | Mod: HCNC,CPTII,S$GLB, | Performed by: STUDENT IN AN ORGANIZED HEALTH CARE EDUCATION/TRAINING PROGRAM

## 2023-12-21 PROCEDURE — 1126F AMNT PAIN NOTED NONE PRSNT: CPT | Mod: HCNC,CPTII,S$GLB, | Performed by: STUDENT IN AN ORGANIZED HEALTH CARE EDUCATION/TRAINING PROGRAM

## 2023-12-21 PROCEDURE — 3078F PR MOST RECENT DIASTOLIC BLOOD PRESSURE < 80 MM HG: ICD-10-PCS | Mod: HCNC,CPTII,S$GLB, | Performed by: STUDENT IN AN ORGANIZED HEALTH CARE EDUCATION/TRAINING PROGRAM

## 2023-12-21 PROCEDURE — 1101F PR PT FALLS ASSESS DOC 0-1 FALLS W/OUT INJ PAST YR: ICD-10-PCS | Mod: HCNC,CPTII,S$GLB, | Performed by: STUDENT IN AN ORGANIZED HEALTH CARE EDUCATION/TRAINING PROGRAM

## 2023-12-21 PROCEDURE — 99999 PR PBB SHADOW E&M-EST. PATIENT-LVL IV: CPT | Mod: PBBFAC,HCNC,, | Performed by: STUDENT IN AN ORGANIZED HEALTH CARE EDUCATION/TRAINING PROGRAM

## 2023-12-21 PROCEDURE — 3078F DIAST BP <80 MM HG: CPT | Mod: HCNC,CPTII,S$GLB, | Performed by: STUDENT IN AN ORGANIZED HEALTH CARE EDUCATION/TRAINING PROGRAM

## 2023-12-21 PROCEDURE — 3288F FALL RISK ASSESSMENT DOCD: CPT | Mod: HCNC,CPTII,S$GLB, | Performed by: STUDENT IN AN ORGANIZED HEALTH CARE EDUCATION/TRAINING PROGRAM

## 2023-12-21 PROCEDURE — 1159F PR MEDICATION LIST DOCUMENTED IN MEDICAL RECORD: ICD-10-PCS | Mod: HCNC,CPTII,S$GLB, | Performed by: STUDENT IN AN ORGANIZED HEALTH CARE EDUCATION/TRAINING PROGRAM

## 2023-12-21 PROCEDURE — 93005 ELECTROCARDIOGRAM TRACING: CPT | Mod: HCNC

## 2023-12-21 NOTE — PROGRESS NOTES
Section of Cardiology                  Cardiac Clinic Note    Chief Complaint/Reason for consultation: establish care       HPI:   Rigoberto Perez is a 83 y.o. male with h/o CHF, HLD, CKD, COPD who was referred to cardiology clinic by PCP for evaluation.    12/21/23  Comes in with wife  Takes care of wife who was on home O2  History of mildly reduced CHF (EF in [id number] 45-50%), recovered to EF 60-65% on most recent echo 2021  Not active at home  Does grocery shopping, cooks  Does all the house work    Takes daily ASA     Still smokes 1/2 pack every 2 days     Gets dizziness every so often  SOB stable  Uses inhalers    Denies chest pain, LE swelling, PND, orthopnea       Family history: no heart issues       EKG 12/21/23 NSR,  LVH, cannot r/o anterior infarct    ECHO  2021     o The left ventricle is normal in size.      o Ejection Fraction = 60-65%.      o There is normal left ventricular wall thickness.      o No regional wall motion abnormalities noted.        STRESS TEST No results found for this or any previous visit.       LHC No results found for this or any previous visit.            ROS: All 10 systems reviewed. Please refer to the HPI for pertinent positives. All other systems negative.     Past Medical History  Past Medical History:   Diagnosis Date    Carotid artery disease     us 8/30/17 shows 50% stenosis    [facility]act 2013    [facility]    COPD (chronic obstructive pulmonary disease)     History of basal cell cancer     Renovascular hypertension     Thrombocytopenia 8/29/2022       Surgical History  Past Surgical History:   Procedure Laterality Date    CATARACT EXTRACTION      CATARACT EXTRACTION, BILATERAL Bilateral 2013    [facility]          Allergies:   Review of patient's allergies indicates:  No Known Allergies    Social History:  Social History     Socioeconomic History    Marital status:    Tobacco Use    Smoking status: Every Day     Current  packs/day: 0.50     Average packs/day: 0.5 packs/day for 70.0 years (35.0 ttl pk-yrs)     Types: Cigarettes    Smokeless tobacco: Never   Substance and Sexual Activity    Alcohol use: No    Drug use: No    Sexual activity: Never     Social Determinants of Health     Financial Resource Strain: Medium Risk (12/1/2023)    Overall Financial Resource Strain (CARDIA)     Difficulty of Paying Living Expenses: Somewhat hard   Food Insecurity: No Food Insecurity (12/1/2023)    Hunger Vital Sign     Worried About Running Out of Food in the Last Year: Never true     Ran Out of Food in the Last Year: Never true   Transportation Needs: No Transportation Needs (12/1/2023)    PRAPARE - Transportation     Lack of Transportation (Medical): No     Lack of Transportation (Non-Medical): No   Physical Activity: Sufficiently Active (12/1/2023)    Exercise Vital Sign     Days of Exercise per Week: 7 days     Minutes of Exercise per Session: 40 min   Stress: No Stress Concern Present (12/1/2023)    [place] [employer] - Occupational Stress Questionnaire     Feeling of Stress : Not at all   Social Connections: Socially Isolated (12/1/2023)    Social Connection and Isolation Panel [NHANES]     Frequency of Communication with Friends and Family: Never     Frequency of Social Gatherings with Friends and Family: Once a week     Attends Muslim Services: Never     Active Member of Clubs or Organizations: No     Attends Club or Organization Meetings: Never     Marital Status:    Housing Stability: Low Risk  (12/1/2023)    Housing Stability Vital Sign     Unable to Pay for Housing in the Last Year: No     Number of Places Lived in the Last Year: 1     Unstable Housing in the Last Year: No       Family History:  family history includes Diabetes in his maternal grandmother.    Home Medications:  Current Outpatient Medications on File Prior to Visit   Medication Sig Dispense Refill    aspirin (ECOTRIN) 81 MG EC tablet  "Take 1 tablet (81 mg total) by mouth once daily. 30 tablet 5    azelastine (ASTELIN) 137 mcg (0.1 %) nasal spray 1 spray (137 mcg total) by Nasal route 2 (two) times daily as needed for Rhinitis (sneezing). 30 mL 11    carbamide peroxide (DEBROX) 6.5 % otic solution Place 5 drops into both ears 2 (two) times daily. For 4 days then continue w 5 drops each ear once weekly 15 mL 2    nebulizer and compressor Ivon Use as directed      rosuvastatin (CRESTOR) 10 MG tablet Take 1 tablet (10 mg total) by mouth once daily. 30 tablet 5    TRELEGY ELLIPTA 100-62.5-25 mcg DsDv INHALE 1 PUFF BY MOUTH EVERY DAY 60 each 5    albuterol (PROAIR HFA) 90 mcg/actuation inhaler Inhale 2 puffs into the lungs every 6 (six) hours as needed for Wheezing. Rescue 18 g 1    blood pressure monitor Kit 1 kit by Misc.(Non-Drug; Combo Route) route once daily. 1 each 0    clotrimazole (LOTRIMIN) 1 % cream Apply topically 2 (two) times daily. 24 g 1    folic acid (FOLVITE) 1 MG tablet Take 1 tablet (1 mg total) by mouth once daily. 90 tablet 1     No current facility-administered medications on file prior to visit.       Physical exam:  /60 (BP Location: Right arm, Patient Position: Sitting, BP Method: Small (Manual))   Pulse 71   Ht 5' 10" (1.778 m)   Wt 48 kg (105 lb 13.1 oz)   SpO2 99%   BMI 15.18 kg/m²         General: Pt is a 83 y.o. year old male who is AAOx3, in NAD, is pleasant, well nourished, looks stated age  HEENT: PERRL, EOMI, Oral mucosa pink & moist  CVS  No abnormal cardiac pulsations noted on inspection. JVP not raised. The apical impulse is normal on palpation, and is located in the left 5th intercostal space in the mid - clavicular line. No palpable thrills or abnormal pulsations noted. RR, S1 - S2 heard, no murmurs, rubs or gallops appreciated.   PUL : CTA B/L. No wheezes/crackles heard   ABD : BS +, soft. No tenderness elicited   LE : No C/C/E. Distal Pulses palpable B/L         LABS:    Chemistry:   Lab Results " "  Component Value Date     06/06/2023    K 5.1 06/06/2023     06/06/2023    CO2 23 06/06/2023    BUN 21 06/06/2023    CREATININE 1.3 06/06/2023    GLUCOSE 116 (H) 02/21/2021    CALCIUM 10.4 06/06/2023     Cardiac Markers:   Lab Results   Component Value Date    TROPONINI <0.006 06/26/2022     Cardiac Markers (Last 3):   Lab Results   Component Value Date    TROPONINI <0.006 06/26/2022    TROPONINI 0.03 06/20/2022     CBC:   Lab Results   Component Value Date    WBC 6.03 09/12/2023    HGB 11.0 (L) 09/12/2023    HCT 35.5 (L) 09/12/2023    MCV 97 09/12/2023     (L) 09/12/2023     Lipids:   Lab Results   Component Value Date    CHOL 129 06/06/2023    TRIG 56 06/06/2023    HDL 53 06/06/2023     Coagulation: No results found for: "PT", "INR", "APTT"        Assessment        1. Stage 3b chronic kidney disease    2. Orthostasis    3. Combined systolic and diastolic congestive heart failure, unspecified HF chronicity    4. Coronary artery disease involving native coronary artery of native heart without angina pectoris    5. Hyperlipidemia, unspecified hyperlipidemia type    6. Panlobular emphysema         Plan:    CHF   Mildly reduced EF, now recovered to EF 60-65%   Denies shortness of breath, lower extremity swelling    COPD   Stable shortness of breath  Currently on trelegy     HLD  LDL 65 as of 6/23  Continue statin    CKD  Improved  Now stable    Tobacco abuse  Smoking cessation encouraged     Low salt, low fat diet  Exercise as tolerated, at least 30 min daily     This note was prepared using voice recognition system and is likely to have sound alike errors that may have been overlooked even after proofreading.     I have reviewed all pertinent chart information.  Plans and recommendations have been formulated under my direct supervision. All questions answered and patient voiced understanding.   If symptoms persist go to the ED.    RTC in 1 year        Brooke Choi MD  Cardiology          "

## 2023-12-28 DIAGNOSIS — J43.2 CENTRILOBULAR EMPHYSEMA: ICD-10-CM

## 2023-12-28 RX ORDER — FLUTICASONE FUROATE, UMECLIDINIUM BROMIDE AND VILANTEROL TRIFENATATE 100; 62.5; 25 UG/1; UG/1; UG/1
1 POWDER RESPIRATORY (INHALATION) DAILY
Qty: 60 EACH | Refills: 5 | Status: SHIPPED | OUTPATIENT
Start: 2023-12-28

## 2024-01-04 ENCOUNTER — OFFICE VISIT (OUTPATIENT)
Dept: PRIMARY CARE CLINIC | Facility: CLINIC | Age: 84
End: 2024-01-04
Payer: MEDICARE

## 2024-01-04 VITALS
DIASTOLIC BLOOD PRESSURE: 58 MMHG | BODY MASS INDEX: 15.31 KG/M2 | HEART RATE: 69 BPM | OXYGEN SATURATION: 100 % | HEIGHT: 70 IN | SYSTOLIC BLOOD PRESSURE: 118 MMHG | WEIGHT: 106.94 LBS

## 2024-01-04 DIAGNOSIS — I25.10 CORONARY ARTERY DISEASE INVOLVING NATIVE CORONARY ARTERY OF NATIVE HEART WITHOUT ANGINA PECTORIS: Chronic | ICD-10-CM

## 2024-01-04 DIAGNOSIS — R64 CACHEXIA: ICD-10-CM

## 2024-01-04 DIAGNOSIS — J43.2 CENTRILOBULAR EMPHYSEMA: Primary | Chronic | ICD-10-CM

## 2024-01-04 DIAGNOSIS — F10.21 ALCOHOL USE DISORDER, MODERATE, IN SUSTAINED REMISSION: ICD-10-CM

## 2024-01-04 DIAGNOSIS — N18.32 STAGE 3B CHRONIC KIDNEY DISEASE: ICD-10-CM

## 2024-01-04 DIAGNOSIS — I50.40 COMBINED SYSTOLIC AND DIASTOLIC CONGESTIVE HEART FAILURE, UNSPECIFIED HF CHRONICITY: ICD-10-CM

## 2024-01-04 DIAGNOSIS — I73.9 PERIPHERAL VASCULAR DISEASE: ICD-10-CM

## 2024-01-04 DIAGNOSIS — H43.12 VITREOUS HEMORRHAGE OF LEFT EYE: ICD-10-CM

## 2024-01-04 PROCEDURE — 1159F MED LIST DOCD IN RCRD: CPT | Mod: HCNC,CPTII,S$GLB, | Performed by: NURSE PRACTITIONER

## 2024-01-04 PROCEDURE — 3078F DIAST BP <80 MM HG: CPT | Mod: HCNC,CPTII,S$GLB, | Performed by: NURSE PRACTITIONER

## 2024-01-04 PROCEDURE — 99213 OFFICE O/P EST LOW 20 MIN: CPT | Mod: HCNC,S$GLB,, | Performed by: NURSE PRACTITIONER

## 2024-01-04 PROCEDURE — 3288F FALL RISK ASSESSMENT DOCD: CPT | Mod: HCNC,CPTII,S$GLB, | Performed by: NURSE PRACTITIONER

## 2024-01-04 PROCEDURE — 99999 PR PBB SHADOW E&M-EST. PATIENT-LVL III: CPT | Mod: PBBFAC,HCNC,, | Performed by: NURSE PRACTITIONER

## 2024-01-04 PROCEDURE — 1101F PT FALLS ASSESS-DOCD LE1/YR: CPT | Mod: HCNC,CPTII,S$GLB, | Performed by: NURSE PRACTITIONER

## 2024-01-04 PROCEDURE — 3074F SYST BP LT 130 MM HG: CPT | Mod: HCNC,CPTII,S$GLB, | Performed by: NURSE PRACTITIONER

## 2024-01-04 NOTE — PROGRESS NOTES
"Rigoberto Perez  01/04/2024  [id number]    Trinidad Bai MD  Patient Care Team:  Trinidad Bai MD as PCP - General (Internal Medicine)  Dwight Rubio MD as Consulting Physician (Otolaryngology)  Joan Cadena NP as Nurse Practitioner (Family Medicine)  Alice Ruiz NP as Nurse Practitioner (Family Medicine)      [facility] Note      Chief Complaint:  Chief Complaint   Patient presents with    Follow-up     Three month follow up. No issues or concerns.          History of Present Illness:  HPI    "A lot of headaches." Intermittent. Mild. Frontal. Brief. Feels like they are r/t dyspnea. Onset about a year ago. No progression or change since onset. Some related dizziness.     "A lot of trouble breathing." Occurs at rest. No aggravating/provoking/alleviating factors. No associated sx. Onset remote. No recent changes.     No other concerns today.     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          Review of Systems   Constitutional:  Negative for activity change and appetite change.   Respiratory:  Positive for shortness of breath. Negative for cough, chest tightness, wheezing and stridor.    Cardiovascular:  Negative for chest pain, palpitations and leg swelling.   Gastrointestinal:  Negative for abdominal pain, constipation, diarrhea, nausea and vomiting.   Genitourinary:  Negative for difficulty urinating, dysuria and frequency.   Musculoskeletal:  Negative for arthralgias and gait problem.   Neurological:  Positive for dizziness and headaches. Negative for tremors, seizures, syncope, speech difficulty and weakness.   Psychiatric/Behavioral:  Negative for behavioral problems, confusion, decreased concentration and sleep disturbance.          The following were reviewed: Active problem list, medication list, allergies, family history, social history, " and Health Maintenance.       Medications:  Current Outpatient Medications on File Prior to Visit   Medication Sig Dispense Refill    aspirin (ECOTRIN) 81 MG EC tablet Take 1 tablet (81 mg total) by mouth once daily. 30 tablet 5    azelastine (ASTELIN) 137 mcg (0.1 %) nasal spray 1 spray (137 mcg total) by Nasal route 2 (two) times daily as needed for Rhinitis (sneezing). 30 mL 11    carbamide peroxide (DEBROX) 6.5 % otic solution Place 5 drops into both ears 2 (two) times daily. For 4 days then continue w 5 drops each ear once weekly 15 mL 2    nebulizer and compressor Ivon Use as directed      rosuvastatin (CRESTOR) 10 MG tablet Take 1 tablet (10 mg total) by mouth once daily. 30 tablet 5    TRELEGY ELLIPTA 100-62.5-25 mcg DsDv Inhale 1 puff into the lungs once daily. 60 each 5    albuterol (PROAIR HFA) 90 mcg/actuation inhaler Inhale 2 puffs into the lungs every 6 (six) hours as needed for Wheezing. Rescue 18 g 1    blood pressure monitor Kit 1 kit by Misc.(Non-Drug; Combo Route) route once daily. 1 each 0    clotrimazole (LOTRIMIN) 1 % cream Apply topically 2 (two) times daily. 24 g 1    folic acid (FOLVITE) 1 MG tablet Take 1 tablet (1 mg total) by mouth once daily. 90 tablet 1     No current facility-administered medications on file prior to visit.       Medications have been reviewed and reconciled with patient at visit today.    Barriers to medications present (no )    Fall since last office visit (no )      Exam:  Vitals:    01/04/24 1017   BP: (!) 118/58   Pulse: 69     Weight: 48.5 kg (106 lb 14.8 oz)   Body mass index is 15.34 kg/m².      BP Readings from Last 3 Encounters:   01/04/24 (!) 118/58   12/21/23 124/60   12/01/23 (!) 122/56     Wt Readings from Last 3 Encounters:   01/04/24 1017 48.5 kg (106 lb 14.8 oz)   12/21/23 1454 48 kg (105 lb 13.1 oz)   12/01/23 1433 48.8 kg (107 lb 8 oz)            Physical Exam  Constitutional:       General: He is not in acute distress.     Comments: Thin, frail  elderly male   HENT:      Right Ear: Tympanic membrane normal. There is no impacted cerumen.      Left Ear: Tympanic membrane normal. There is no impacted cerumen.      Mouth/Throat:      Mouth: Mucous membranes are moist.      Pharynx: No oropharyngeal exudate or posterior oropharyngeal erythema.   Eyes:      General: No scleral icterus.  Cardiovascular:      Rate and Rhythm: Normal rate and regular rhythm.   Pulmonary:      Effort: No respiratory distress.      Breath sounds: Normal breath sounds.   Abdominal:      General: There is no distension.      Palpations: Abdomen is soft.      Tenderness: There is no abdominal tenderness.   Musculoskeletal:         General: No swelling.      Cervical back: No tenderness.   Lymphadenopathy:      Cervical: No cervical adenopathy.   Skin:     General: Skin is warm.   Neurological:      Mental Status: He is alert and oriented to person, place, and time. Mental status is at baseline.      Gait: Gait normal.   Psychiatric:         Mood and Affect: Mood normal.         Behavior: Behavior normal.         Laboratory Reviewed: (Yes)  Lab Results   Component Value Date    WBC 6.03 09/12/2023    HGB 11.0 (L) 09/12/2023    HCT 35.5 (L) 09/12/2023     (L) 09/12/2023    CHOL 129 06/06/2023    TRIG 56 06/06/2023    HDL 53 06/06/2023    ALT 14 06/06/2023    AST 24 06/06/2023     06/06/2023    K 5.1 06/06/2023     06/06/2023    CREATININE 1.3 06/06/2023    BUN 21 06/06/2023    CO2 23 06/06/2023    TSH 3.360 09/12/2023    HGBA1C 5.3 06/06/2023         CT HEAD WO CONTRAST.    COMPARISON:    No prior study.    FINDINGS:    Automated exposure control was used for dose reduction.    No intracranial hemorrhage, mass or mass effect. Mild cerebral atrophy. Minimal right and minimal mild left periventricular deep white matter hypodensity, greatest left lateral region. Mild bilateral subinsular white matter hypodensity, greater on left. No acute loss of gray-white matter  differentiation. The ventricles and cisterns appear within normal limits. Mild/moderate calcification intracranial internal carotids.    The surrounding osseous and soft tissues show no acute finding.        Health Maintenance  Health Maintenance Topics with due status: Not Due       Topic Last Completion Date    Hemoglobin A1c (Prediabetes) 06/06/2023    Lipid Panel 06/06/2023    Aspirin/Antiplatelet Therapy 01/04/2024     Health Maintenance Due   Topic Date Due    TETANUS VACCINE  Never done    Shingles Vaccine (1 of 2) Never done    RSV Vaccine (Age 60+ and Pregnant patients) (1 - 1-dose 60+ series) Never done           Assessment:  Problem List Items Addressed This Visit          Psychiatric    Alcohol use disorder, moderate, in sustained remission (Chronic)     Remains in remission.             Ophtho    Vitreous hemorrhage of left eye     Continue f/u with ophth as directed.            Pulmonary    Centrilobular emphysema - Primary (Chronic)     With some mild baseline dyspnea.  No change in sx.    Doesn't interfere with most ADLs.   Pox 100%.  Monitor.             Cardiac/Vascular    Coronary artery disease involving native coronary artery of native heart without angina pectoris (Chronic)     Continue to follow up with cardiology.          Peripheral vascular disease     No new sx. Continue statin.         Congestive heart failure     No acute sx.            Renal/    Stage 3b chronic kidney disease     Renal dose meds.  Discouraged NSAID use.              Other    Cachexia     Wt Readings from Last 3 Encounters:   01/04/24 1017 48.5 kg (106 lb 14.8 oz)   12/21/23 1454 48 kg (105 lb 13.1 oz)   12/01/23 1433 48.8 kg (107 lb 8 oz)   Weight is stable. Monitor.              Plan:  Centrilobular emphysema    Coronary artery disease involving native coronary artery of native heart without angina pectoris    Alcohol use disorder, moderate, in sustained remission    Combined systolic and diastolic congestive heart  failure, unspecified HF chronicity    Vitreous hemorrhage of left eye    Cachexia    Peripheral vascular disease    Stage 3b chronic kidney disease      -Patient's lab results were reviewed and discussed with patient  -Treatment options and alternatives were discussed with the patient. Patient expressed understanding. Patient was given the opportunity to ask questions and be an active participant in their medical care. Patient had no further questions or concerns at this time.   -Documentation of patient's health and condition was obtained from family member who was present during visit.  -Patient is an overall moderate risk for health complications from their medical conditions.       Follow up: Follow up in about 2 months (around 3/4/2024).      After visit summary printed and given to patient upon discharge.  Patient goals and care plan are included in After visit summary.    Total medical decision making time was 24 min.  The following issues were discussed: The primary encounter diagnosis was Centrilobular emphysema. Diagnoses of Coronary artery disease involving native coronary artery of native heart without angina pectoris, Alcohol use disorder, moderate, in sustained remission, Combined systolic and diastolic congestive heart failure, unspecified HF chronicity, Vitreous hemorrhage of left eye, Cachexia, Peripheral vascular disease, and Stage 3b chronic kidney disease were also pertinent to this visit.    Health maintenance needs, recent test results and goals of care discussed with pt and questions answered.

## 2024-01-04 NOTE — PATIENT INSTRUCTIONS
If you are feeling unwell, we'd like to be the first ones to know here at [facility]! Please give us a call. Same day appointments are our top priority to keep you well and out of the emergency rooms and hospitals. Call [phone] for our direct line. After hours advice is always available. Please call [phone] after hours to speak to the on-call team.

## 2024-01-04 NOTE — ASSESSMENT & PLAN NOTE
Wt Readings from Last 3 Encounters:   01/04/24 1017 48.5 kg (106 lb 14.8 oz)   12/21/23 1454 48 kg (105 lb 13.1 oz)   12/01/23 1433 48.8 kg (107 lb 8 oz)   Weight is stable. Monitor.

## 2024-01-04 NOTE — ASSESSMENT & PLAN NOTE
With some mild baseline dyspnea.  No change in sx.    Doesn't interfere with most ADLs.   Pox 100%.  Monitor.

## 2024-02-10 DIAGNOSIS — J30.9 ALLERGIC RHINITIS, UNSPECIFIED SEASONALITY, UNSPECIFIED TRIGGER: ICD-10-CM

## 2024-02-12 RX ORDER — AZELASTINE 1 MG/ML
SPRAY, METERED NASAL
Qty: 30 ML | Refills: 11 | Status: SHIPPED | OUTPATIENT
Start: 2024-02-12

## 2024-02-28 ENCOUNTER — OFFICE VISIT (OUTPATIENT)
Dept: DERMATOLOGY | Facility: CLINIC | Age: 84
End: 2024-02-28
Payer: MEDICARE

## 2024-02-28 DIAGNOSIS — Z85.828 HISTORY OF BASAL CELL CANCER: ICD-10-CM

## 2024-02-28 DIAGNOSIS — L57.0 ACTINIC KERATOSES: Primary | ICD-10-CM

## 2024-02-28 DIAGNOSIS — L82.1 SEBORRHEIC KERATOSES: ICD-10-CM

## 2024-02-28 DIAGNOSIS — Z12.83 SCREENING, MALIGNANT NEOPLASM, SKIN: ICD-10-CM

## 2024-02-28 PROCEDURE — 99203 OFFICE O/P NEW LOW 30 MIN: CPT | Mod: 25,S$GLB,, | Performed by: STUDENT IN AN ORGANIZED HEALTH CARE EDUCATION/TRAINING PROGRAM

## 2024-02-28 PROCEDURE — 1126F AMNT PAIN NOTED NONE PRSNT: CPT | Mod: CPTII,S$GLB,, | Performed by: STUDENT IN AN ORGANIZED HEALTH CARE EDUCATION/TRAINING PROGRAM

## 2024-02-28 PROCEDURE — 3288F FALL RISK ASSESSMENT DOCD: CPT | Mod: CPTII,S$GLB,, | Performed by: STUDENT IN AN ORGANIZED HEALTH CARE EDUCATION/TRAINING PROGRAM

## 2024-02-28 PROCEDURE — 17003 DESTRUCT PREMALG LES 2-14: CPT | Mod: S$GLB,,, | Performed by: STUDENT IN AN ORGANIZED HEALTH CARE EDUCATION/TRAINING PROGRAM

## 2024-02-28 PROCEDURE — 1159F MED LIST DOCD IN RCRD: CPT | Mod: CPTII,S$GLB,, | Performed by: STUDENT IN AN ORGANIZED HEALTH CARE EDUCATION/TRAINING PROGRAM

## 2024-02-28 PROCEDURE — 1160F RVW MEDS BY RX/DR IN RCRD: CPT | Mod: CPTII,S$GLB,, | Performed by: STUDENT IN AN ORGANIZED HEALTH CARE EDUCATION/TRAINING PROGRAM

## 2024-02-28 PROCEDURE — 17000 DESTRUCT PREMALG LESION: CPT | Mod: S$GLB,,, | Performed by: STUDENT IN AN ORGANIZED HEALTH CARE EDUCATION/TRAINING PROGRAM

## 2024-02-28 PROCEDURE — 1101F PT FALLS ASSESS-DOCD LE1/YR: CPT | Mod: CPTII,S$GLB,, | Performed by: STUDENT IN AN ORGANIZED HEALTH CARE EDUCATION/TRAINING PROGRAM

## 2024-02-28 PROCEDURE — 99999 PR PBB SHADOW E&M-EST. PATIENT-LVL II: CPT | Mod: PBBFAC,,, | Performed by: STUDENT IN AN ORGANIZED HEALTH CARE EDUCATION/TRAINING PROGRAM

## 2024-02-28 NOTE — PROGRESS NOTES
Subjective:       Patient ID:  Rigoberto Perez is a 83 y.o. male who presents for   Chief Complaint   Patient presents with    Skin Check     History of Present Illness: The patient presents with chief complaint of a skin examination and mole check. This is a high risk patient here to check for the development of new lesions. Has a history of skin cancer in the past.   Location: has several moles/growths on the scalp and back  Duration: ongoing for several months to year  Signs/Symptoms: patient denies any rapidly growing, bleeding or non healing skin lesions.   Prior treatments: none          Review of Systems   Constitutional:  Negative for fever and chills.   Skin:  Negative for itching, rash and dry skin.        Objective:    Physical Exam   Constitutional: He appears well-developed and well-nourished. No distress.   Neurological: He is alert and oriented to person, place, and time. He is not disoriented.   Psychiatric: He has a normal mood and affect.   Skin:   Areas Examined (abnormalities noted in diagram):   Scalp / Hair Palpated and Inspected  Head / Face Inspection Performed  Neck Inspection Performed  Chest / Axilla Inspection Performed  Abdomen Inspection Performed  Back Inspection Performed  RUE Inspected  LUE Inspection Performed  RLE Inspected  LLE Inspection Performed                   Diagram Legend     Erythematous scaling macule/papule c/w actinic keratosis       Vascular papule c/w angioma      Pigmented verrucoid papule/plaque c/w seborrheic keratosis      Yellow umbilicated papule c/w sebaceous hyperplasia      Irregularly shaped tan macule c/w lentigo     1-2 mm smooth white papules consistent with Milia      Movable subcutaneous cyst with punctum c/w epidermal inclusion cyst      Subcutaneous movable cyst c/w pilar cyst      Firm pink to brown papule c/w dermatofibroma      Pedunculated fleshy papule(s) c/w skin tag(s)      Evenly pigmented macule c/w junctional nevus     Mildly  variegated pigmented, slightly irregular-bordered macule c/w mildly atypical nevus      Flesh colored to evenly pigmented papule c/w intradermal nevus       Pink pearly papule/plaque c/w basal cell carcinoma      Erythematous hyperkeratotic cursted plaque c/w SCC      Surgical scar with no sign of skin cancer recurrence      Open and closed comedones      Inflammatory papules and pustules      Verrucoid papule consistent consistent with wart     Erythematous eczematous patches and plaques     Dystrophic onycholytic nail with subungual debris c/w onychomycosis     Umbilicated papule    Erythematous-base heme-crusted tan verrucoid plaque consistent with inflamed seborrheic keratosis     Erythematous Silvery Scaling Plaque c/w Psoriasis     See annotation      Assessment / Plan:        Actinic keratoses  Cryosurgery Procedure Note    Verbal consent from the patient is obtained including, but not limited to, risk of hypopigmentation/hyperpigmentation, scar, recurrence of lesion. The patient is aware of the precancerous quality and need for treatment of these lesions. Liquid nitrogen cryosurgery is applied to the 9 actinic keratoses, as detailed in the physical exam, to produce a freeze injury. The patient is aware that blisters may form and is instructed on wound care with gentle cleansing and use of vaseline ointment to keep moist until healed. The patient is supplied a handout on cryosurgery and is instructed to call if lesions do not completely resolve.      Seborrheic keratoses  These are benign inherited growths without a malignant potential. Reassurance given to patient. No treatment is necessary.     Screening, malignant neoplasm, skin  Upper and lower body skin examination performed today including at least 10 points as noted in physical examination. No lesions suspicious for malignancy noted.  Reassurance provided.    Instructed patient to observe lesion(s) for changes and follow up in clinic if changes are  noted. Patient to monitor skin at home for new or changing lesions and follow up in clinic if noted.           Follow up in about 1 year (around 2/28/2025).

## 2024-02-28 NOTE — PATIENT INSTRUCTIONS
